# Patient Record
Sex: FEMALE | Race: WHITE | NOT HISPANIC OR LATINO
[De-identification: names, ages, dates, MRNs, and addresses within clinical notes are randomized per-mention and may not be internally consistent; named-entity substitution may affect disease eponyms.]

---

## 2017-05-01 ENCOUNTER — APPOINTMENT (OUTPATIENT)
Dept: CARDIOLOGY | Facility: CLINIC | Age: 67
End: 2017-05-01

## 2017-05-01 ENCOUNTER — NON-APPOINTMENT (OUTPATIENT)
Age: 67
End: 2017-05-01

## 2017-05-01 VITALS
HEART RATE: 73 BPM | BODY MASS INDEX: 20.25 KG/M2 | HEIGHT: 66 IN | OXYGEN SATURATION: 97 % | DIASTOLIC BLOOD PRESSURE: 76 MMHG | WEIGHT: 126 LBS | SYSTOLIC BLOOD PRESSURE: 135 MMHG

## 2017-05-01 DIAGNOSIS — Z82.49 FAMILY HISTORY OF ISCHEMIC HEART DISEASE AND OTHER DISEASES OF THE CIRCULATORY SYSTEM: ICD-10-CM

## 2017-05-30 ENCOUNTER — APPOINTMENT (OUTPATIENT)
Dept: ENDOCRINOLOGY | Facility: CLINIC | Age: 67
End: 2017-05-30

## 2017-05-30 VITALS
SYSTOLIC BLOOD PRESSURE: 100 MMHG | OXYGEN SATURATION: 99 % | BODY MASS INDEX: 20.12 KG/M2 | HEIGHT: 66 IN | WEIGHT: 125.2 LBS | DIASTOLIC BLOOD PRESSURE: 60 MMHG | HEART RATE: 109 BPM

## 2017-06-22 ENCOUNTER — APPOINTMENT (OUTPATIENT)
Dept: CARDIOLOGY | Facility: CLINIC | Age: 67
End: 2017-06-22

## 2017-06-28 ENCOUNTER — OUTPATIENT (OUTPATIENT)
Dept: OUTPATIENT SERVICES | Facility: HOSPITAL | Age: 67
LOS: 1 days | End: 2017-06-28
Payer: COMMERCIAL

## 2017-06-28 ENCOUNTER — APPOINTMENT (OUTPATIENT)
Dept: ULTRASOUND IMAGING | Facility: CLINIC | Age: 67
End: 2017-06-28

## 2017-06-28 ENCOUNTER — APPOINTMENT (OUTPATIENT)
Dept: MAMMOGRAPHY | Facility: CLINIC | Age: 67
End: 2017-06-28

## 2017-06-28 DIAGNOSIS — Z00.8 ENCOUNTER FOR OTHER GENERAL EXAMINATION: ICD-10-CM

## 2017-06-28 PROCEDURE — 76641 ULTRASOUND BREAST COMPLETE: CPT

## 2017-06-28 PROCEDURE — 77067 SCR MAMMO BI INCL CAD: CPT

## 2017-06-28 PROCEDURE — 77063 BREAST TOMOSYNTHESIS BI: CPT

## 2017-07-05 DIAGNOSIS — R92.2 INCONCLUSIVE MAMMOGRAM: ICD-10-CM

## 2017-07-05 DIAGNOSIS — Z12.31 ENCOUNTER FOR SCREENING MAMMOGRAM FOR MALIGNANT NEOPLASM OF BREAST: ICD-10-CM

## 2017-07-05 DIAGNOSIS — Z85.3 PERSONAL HISTORY OF MALIGNANT NEOPLASM OF BREAST: ICD-10-CM

## 2017-09-07 ENCOUNTER — APPOINTMENT (OUTPATIENT)
Dept: SURGERY | Facility: CLINIC | Age: 67
End: 2017-09-07
Payer: MEDICARE

## 2017-09-07 PROCEDURE — 99213K: CUSTOM

## 2017-10-10 ENCOUNTER — APPOINTMENT (OUTPATIENT)
Dept: CARDIOLOGY | Facility: CLINIC | Age: 67
End: 2017-10-10
Payer: MEDICARE

## 2017-10-10 DIAGNOSIS — R94.39 ABNORMAL RESULT OF OTHER CARDIOVASCULAR FUNCTION STUDY: ICD-10-CM

## 2017-10-10 PROCEDURE — 93015 CV STRESS TEST SUPVJ I&R: CPT

## 2017-10-12 PROBLEM — R94.39 ABNORMAL STRESS ECG: Status: ACTIVE | Noted: 2017-10-12

## 2017-10-31 ENCOUNTER — FORM ENCOUNTER (OUTPATIENT)
Age: 67
End: 2017-10-31

## 2017-11-01 ENCOUNTER — OUTPATIENT (OUTPATIENT)
Dept: OUTPATIENT SERVICES | Facility: HOSPITAL | Age: 67
LOS: 1 days | End: 2017-11-01
Payer: MEDICARE

## 2017-11-01 ENCOUNTER — APPOINTMENT (OUTPATIENT)
Dept: RADIOLOGY | Facility: IMAGING CENTER | Age: 67
End: 2017-11-01
Payer: MEDICARE

## 2017-11-01 DIAGNOSIS — M85.80 OTHER SPECIFIED DISORDERS OF BONE DENSITY AND STRUCTURE, UNSPECIFIED SITE: ICD-10-CM

## 2017-11-01 PROCEDURE — 77080 DXA BONE DENSITY AXIAL: CPT | Mod: 26

## 2017-11-01 PROCEDURE — 77080 DXA BONE DENSITY AXIAL: CPT

## 2017-11-03 ENCOUNTER — APPOINTMENT (OUTPATIENT)
Dept: CARDIOLOGY | Facility: CLINIC | Age: 67
End: 2017-11-03
Payer: MEDICARE

## 2017-11-03 DIAGNOSIS — R09.89 OTHER SPECIFIED SYMPTOMS AND SIGNS INVOLVING THE CIRCULATORY AND RESPIRATORY SYSTEMS: ICD-10-CM

## 2017-11-03 PROCEDURE — 78452 HT MUSCLE IMAGE SPECT MULT: CPT

## 2017-11-03 PROCEDURE — 93015 CV STRESS TEST SUPVJ I&R: CPT

## 2017-11-03 PROCEDURE — A9500: CPT

## 2017-11-06 PROBLEM — R09.89 OTH SYMPTOMS AND SIGNS INVOLVING THE CIRC AND RESP SYSTEMS: Status: ACTIVE | Noted: 2017-11-06

## 2017-11-15 ENCOUNTER — APPOINTMENT (OUTPATIENT)
Dept: ENDOCRINOLOGY | Facility: CLINIC | Age: 67
End: 2017-11-15
Payer: MEDICARE

## 2017-11-15 VITALS
OXYGEN SATURATION: 98 % | BODY MASS INDEX: 20.09 KG/M2 | HEART RATE: 76 BPM | HEIGHT: 66 IN | WEIGHT: 125 LBS | DIASTOLIC BLOOD PRESSURE: 70 MMHG | SYSTOLIC BLOOD PRESSURE: 110 MMHG

## 2017-11-15 PROCEDURE — 99214 OFFICE O/P EST MOD 30 MIN: CPT

## 2017-11-21 ENCOUNTER — APPOINTMENT (OUTPATIENT)
Dept: CARDIOLOGY | Facility: CLINIC | Age: 67
End: 2017-11-21
Payer: MEDICARE

## 2017-11-21 PROCEDURE — 93880 EXTRACRANIAL BILAT STUDY: CPT

## 2017-12-04 ENCOUNTER — APPOINTMENT (OUTPATIENT)
Dept: MRI IMAGING | Facility: CLINIC | Age: 67
End: 2017-12-04
Payer: MEDICARE

## 2017-12-04 ENCOUNTER — OUTPATIENT (OUTPATIENT)
Dept: OUTPATIENT SERVICES | Facility: HOSPITAL | Age: 67
LOS: 1 days | End: 2017-12-04
Payer: MEDICARE

## 2017-12-04 DIAGNOSIS — Z00.8 ENCOUNTER FOR OTHER GENERAL EXAMINATION: ICD-10-CM

## 2017-12-04 PROCEDURE — 82565 ASSAY OF CREATININE: CPT

## 2017-12-04 PROCEDURE — 0159T: CPT | Mod: 26

## 2017-12-04 PROCEDURE — C8908: CPT

## 2017-12-04 PROCEDURE — A9585: CPT

## 2017-12-04 PROCEDURE — C8937: CPT

## 2017-12-04 PROCEDURE — 77059 MRI BREAST BILATERAL: CPT | Mod: 26

## 2017-12-06 ENCOUNTER — APPOINTMENT (OUTPATIENT)
Dept: ENDOCRINOLOGY | Facility: CLINIC | Age: 67
End: 2017-12-06

## 2018-06-11 ENCOUNTER — APPOINTMENT (OUTPATIENT)
Dept: ENDOCRINOLOGY | Facility: CLINIC | Age: 68
End: 2018-06-11
Payer: MEDICARE

## 2018-06-11 VITALS
HEART RATE: 77 BPM | BODY MASS INDEX: 21.53 KG/M2 | SYSTOLIC BLOOD PRESSURE: 110 MMHG | OXYGEN SATURATION: 98 % | HEIGHT: 66 IN | DIASTOLIC BLOOD PRESSURE: 70 MMHG | WEIGHT: 134 LBS

## 2018-06-11 PROCEDURE — 99214 OFFICE O/P EST MOD 30 MIN: CPT | Mod: GC

## 2018-06-12 LAB
25(OH)D3 SERPL-MCNC: 49.8 NG/ML
ALBUMIN SERPL ELPH-MCNC: 5.1 G/DL
ALP BLD-CCNC: 37 U/L
ALT SERPL-CCNC: 6 U/L
ANION GAP SERPL CALC-SCNC: 14 MMOL/L
AST SERPL-CCNC: 22 U/L
BILIRUB SERPL-MCNC: 0.3 MG/DL
BUN SERPL-MCNC: 18 MG/DL
CALCIUM SERPL-MCNC: 10.4 MG/DL
CHLORIDE SERPL-SCNC: 102 MMOL/L
CO2 SERPL-SCNC: 27 MMOL/L
CREAT SERPL-MCNC: 0.87 MG/DL
GLUCOSE SERPL-MCNC: 103 MG/DL
HBA1C MFR BLD HPLC: 5.6 %
POTASSIUM SERPL-SCNC: 4.3 MMOL/L
PROT SERPL-MCNC: 7.8 G/DL
SODIUM SERPL-SCNC: 143 MMOL/L
T4 FREE SERPL-MCNC: 1.4 NG/DL
TSH SERPL-ACNC: 1.38 UIU/ML

## 2018-06-28 ENCOUNTER — FORM ENCOUNTER (OUTPATIENT)
Age: 68
End: 2018-06-28

## 2018-06-29 ENCOUNTER — APPOINTMENT (OUTPATIENT)
Dept: ULTRASOUND IMAGING | Facility: CLINIC | Age: 68
End: 2018-06-29
Payer: MEDICARE

## 2018-06-29 ENCOUNTER — APPOINTMENT (OUTPATIENT)
Dept: MAMMOGRAPHY | Facility: CLINIC | Age: 68
End: 2018-06-29
Payer: MEDICARE

## 2018-06-29 ENCOUNTER — OUTPATIENT (OUTPATIENT)
Dept: OUTPATIENT SERVICES | Facility: HOSPITAL | Age: 68
LOS: 1 days | End: 2018-06-29
Payer: MEDICARE

## 2018-06-29 DIAGNOSIS — Z00.8 ENCOUNTER FOR OTHER GENERAL EXAMINATION: ICD-10-CM

## 2018-06-29 PROCEDURE — 77063 BREAST TOMOSYNTHESIS BI: CPT | Mod: 26

## 2018-06-29 PROCEDURE — 77067 SCR MAMMO BI INCL CAD: CPT | Mod: 26

## 2018-06-29 PROCEDURE — 77063 BREAST TOMOSYNTHESIS BI: CPT

## 2018-06-29 PROCEDURE — 77067 SCR MAMMO BI INCL CAD: CPT

## 2018-06-29 PROCEDURE — 76641 ULTRASOUND BREAST COMPLETE: CPT | Mod: 26,50

## 2018-06-29 PROCEDURE — 76641 ULTRASOUND BREAST COMPLETE: CPT

## 2018-09-12 ENCOUNTER — APPOINTMENT (OUTPATIENT)
Dept: SURGERY | Facility: CLINIC | Age: 68
End: 2018-09-12
Payer: MEDICARE

## 2018-09-12 PROCEDURE — 99213K: CUSTOM

## 2018-11-04 ENCOUNTER — RX RENEWAL (OUTPATIENT)
Age: 68
End: 2018-11-04

## 2018-11-05 ENCOUNTER — FORM ENCOUNTER (OUTPATIENT)
Age: 68
End: 2018-11-05

## 2018-11-06 ENCOUNTER — OUTPATIENT (OUTPATIENT)
Dept: OUTPATIENT SERVICES | Facility: HOSPITAL | Age: 68
LOS: 1 days | End: 2018-11-06
Payer: MEDICARE

## 2018-11-06 ENCOUNTER — APPOINTMENT (OUTPATIENT)
Dept: RADIOLOGY | Facility: IMAGING CENTER | Age: 68
End: 2018-11-06
Payer: MEDICARE

## 2018-11-06 DIAGNOSIS — M85.80 OTHER SPECIFIED DISORDERS OF BONE DENSITY AND STRUCTURE, UNSPECIFIED SITE: ICD-10-CM

## 2018-11-06 PROCEDURE — 77080 DXA BONE DENSITY AXIAL: CPT

## 2018-11-06 PROCEDURE — 77080 DXA BONE DENSITY AXIAL: CPT | Mod: 26

## 2018-11-14 ENCOUNTER — OTHER (OUTPATIENT)
Age: 68
End: 2018-11-14

## 2018-12-05 ENCOUNTER — OUTPATIENT (OUTPATIENT)
Dept: OUTPATIENT SERVICES | Facility: HOSPITAL | Age: 68
LOS: 1 days | End: 2018-12-05
Payer: MEDICARE

## 2018-12-05 ENCOUNTER — APPOINTMENT (OUTPATIENT)
Dept: MRI IMAGING | Facility: CLINIC | Age: 68
End: 2018-12-05

## 2018-12-05 DIAGNOSIS — Z00.8 ENCOUNTER FOR OTHER GENERAL EXAMINATION: ICD-10-CM

## 2018-12-05 PROCEDURE — A9585: CPT

## 2018-12-05 PROCEDURE — C8937: CPT

## 2018-12-05 PROCEDURE — C8908: CPT

## 2018-12-05 PROCEDURE — 77059 MRI BREAST BILATERAL: CPT | Mod: 26

## 2018-12-05 PROCEDURE — 82565 ASSAY OF CREATININE: CPT

## 2018-12-05 PROCEDURE — 0159T: CPT | Mod: 26

## 2018-12-19 ENCOUNTER — APPOINTMENT (OUTPATIENT)
Dept: ENDOCRINOLOGY | Facility: CLINIC | Age: 68
End: 2018-12-19
Payer: MEDICARE

## 2018-12-19 VITALS — SYSTOLIC BLOOD PRESSURE: 114 MMHG | HEART RATE: 78 BPM | DIASTOLIC BLOOD PRESSURE: 72 MMHG | OXYGEN SATURATION: 97 %

## 2018-12-19 VITALS — BODY MASS INDEX: 21.86 KG/M2 | HEIGHT: 66 IN | WEIGHT: 136 LBS

## 2018-12-19 LAB
ALBUMIN SERPL ELPH-MCNC: 4.9 G/DL
ALP BLD-CCNC: 33 U/L
ALT SERPL-CCNC: 5 U/L
ANION GAP SERPL CALC-SCNC: 12 MMOL/L
AST SERPL-CCNC: 18 U/L
BILIRUB SERPL-MCNC: 0.4 MG/DL
BUN SERPL-MCNC: 26 MG/DL
CALCIUM SERPL-MCNC: 10.2 MG/DL
CHLORIDE SERPL-SCNC: 102 MMOL/L
CO2 SERPL-SCNC: 28 MMOL/L
CREAT SERPL-MCNC: 0.79 MG/DL
GLUCOSE SERPL-MCNC: 113 MG/DL
POTASSIUM SERPL-SCNC: 3.9 MMOL/L
PROT SERPL-MCNC: 8 G/DL
SODIUM SERPL-SCNC: 142 MMOL/L

## 2018-12-19 PROCEDURE — 99214 OFFICE O/P EST MOD 30 MIN: CPT | Mod: GC

## 2019-06-12 ENCOUNTER — APPOINTMENT (OUTPATIENT)
Dept: ENDOCRINOLOGY | Facility: CLINIC | Age: 69
End: 2019-06-12
Payer: MEDICARE

## 2019-06-12 VITALS
WEIGHT: 140 LBS | HEIGHT: 66 IN | SYSTOLIC BLOOD PRESSURE: 110 MMHG | BODY MASS INDEX: 22.5 KG/M2 | HEART RATE: 77 BPM | OXYGEN SATURATION: 98 % | DIASTOLIC BLOOD PRESSURE: 60 MMHG

## 2019-06-12 PROCEDURE — 99214 OFFICE O/P EST MOD 30 MIN: CPT

## 2019-06-12 NOTE — ASSESSMENT
[Bisphosphonate Therapy] : Risks  and benefits of bisphosphonate therapy were  discussed with the patient including gastroesophageal irritation, osteonecrosis of the jaw, and atypical femur fractures, and acute phase reaction [FreeTextEntry1] : 68 y.o. female with h/o osteopenia and thyroid nodules.\par 1. Osteopenia- Patient has been restarted on Alendronate since November 2017 and is tolerating it well. As per recent DEXA 1 year in therapy, hip stable, and spine with 4.5% increase, but suspect may also be secondary to osteoarthritic changes. Overall stable on current therapy. Encouraged weight bearing activity. Will continue current calcium and vitamin D supplement. Recommended that can take one pill daily on days with adequate calcium intake with dairy and leafy greens. \par -will check CMP, serum calcium and intact PTH\par 2. Thyroid nodules- Patient appears euthyroid. Will check TFTs. Nodules are stable and will monitor for now. Repeat ultrasound in November 2019. \par 3. IFG-  Will check Hba1c. Will monitor for now and encouraged carbohydrate consistent diet\par \par Follow up in 6 months\par Follow up with ortho for hip pain and foot pain

## 2019-06-12 NOTE — REVIEW OF SYSTEMS
[Fatigue] : no fatigue [Decreased Appetite] : appetite not decreased [Recent Weight Gain (___ Lbs)] : no recent weight gain [Recent Weight Loss (___ Lbs)] : no recent weight loss [Chills] : no chills [Blurry Vision] : no blurred vision [Eye Pain] : no eye pain [Dysphagia] : no dysphagia [Dysphonia] : no dysphonia [Neck Pain] : no neck pain [Nasal Congestion] : no nasal congestion [Chest Pain] : no chest pain [Palpitations] : no palpitations [Heart Rate Is Slow] : the heart rate was not slow [Heart Rate Is Fast] : the heart rate was not fast [Lower Ext Edema] : no lower extremity edema [Shortness Of Breath] : no shortness of breath [Wheezing] : no wheezing was heard [Cough] : no cough [SOB on Exertion] : no shortness of breath during exertion [Nausea] : no nausea [Vomiting] : no vomiting was observed [Constipation] : no constipation [Diarrhea] : no diarrhea [Abdominal Pain] : no abdominal pain [Polyuria] : no polyuria [Dysuria] : no dysuria [Joint Pain] : joint pain [Joint Stiffness] : no joint stiffness [Muscle Weakness] : no muscle weakness [Muscle Cramps] : no muscle cramps [Headache] : no headaches [Tremors] : no tremors [Dizziness] : no dizziness [Pain/Numbness of Digits] : no pain/numbness of digits [Polydipsia] : no polydipsia [Cold Intolerance] : cold tolerant [Heat Intolerance] : heat tolerant

## 2019-06-12 NOTE — PHYSICAL EXAM
[Alert] : alert [Well Nourished] : well nourished [No Acute Distress] : no acute distress [Well Developed] : well developed [Normal Sclera/Conjunctiva] : normal sclera/conjunctiva [Normal Voice/Communication] : normal voice communication [Healthy Appearance] : healthy appearance [EOMI] : extra ocular movement intact [Normal Oropharynx] : the oropharynx was normal [Normal Nasal Mucosa] : the nasal mucosa was normal [Normal Lips/Gums] : the lips and gums were normal [No Neck Mass] : no neck mass was observed [Supple] : the neck was supple [No LAD] : no lymphadenopathy [Clear to Auscultation] : lungs were clear to auscultation bilaterally [Normal Rate] : heart rate was normal  [Normal S1, S2] : normal S1 and S2 [Regular Rhythm] : with a regular rhythm [No Edema] : there was no peripheral edema [Not Tender] : non-tender [Normal Bowel Sounds] : normal bowel sounds [Not Distended] : not distended [Soft] : abdomen soft [Post Cervical Nodes] : posterior cervical nodes [Normal] : normal and non tender [Anterior Cervical Nodes] : anterior cervical nodes [No Clubbing, Cyanosis] : no clubbing  or cyanosis of the fingernails [No Rash] : no rash [Normal Reflexes] : deep tendon reflexes were 2+ and symmetric [No Sensory Deficits] : the sensory exam was normal to light touch and pinprick [Normal Affect] : the affect was normal [Normal Mood] : the mood was normal [No Accessory Muscle Use] : no accessory muscle use [de-identified] : prominent gland

## 2019-06-12 NOTE — HISTORY OF PRESENT ILLNESS
[FreeTextEntry1] : 68 y.o. female with h/o osteopenia and thyroid nodules here for follow up visit. Following with Dr. Escamilla for breast cancer s/p lumpectomy and RT and planning to be on Arimidex for total of 10 years- until Oct 2019. Was on drug holiday from Alendronate for 2 years from November 2015 until November 2017. Was treated with Alendronate from 2005 until 2015. No falls or fractures. C/o b/l hip pain since May 2019 but better. Never went for x-rays. Takes Citracal  600 mg twice daily with vitamin D 500 IU. DEXA scan in November 2017 showed femoral neck -2.3 total hip -2.2 and spine -0.7. A 6% decrease in the Fem neck was noted as compared to 2016. Patient was offered Prolia but she declined. She was restarted on Alendronate in November 2017. Doing some walking. Follows with dentist, no plans for major dental procedures. Reports has a history of gastric ulcers >10 years ago and had to get EGD. Reports has been tolerating Alendronate well for 10 years with no acid reflux. Most recent DEXA in November 2018, with femoral neck -2.4, stable, total hip -2.1 stable, and spine -1.2% a 4.5% increase. \par \par In regards to thyroid nodules, no dysphagia and no dysphonia. Sonogram in April 2017 showed increase in size of left lower pole and right mid/lower pole nodules. However, thyroid ultrasound on 11/2/17 showed stable b/l nodules. Had FNA in 2013 of left mid and lower pole nodules that were benign. Thyroid US in November 2018 showed stable thyroid nodules, with appearance of a new subcentimeter nodules on the right and left. No lymphadenopathy. \par

## 2019-06-14 LAB
25(OH)D3 SERPL-MCNC: 46.3 NG/ML
ALBUMIN SERPL ELPH-MCNC: 5.1 G/DL
ALP BLD-CCNC: 38 U/L
ALT SERPL-CCNC: 7 U/L
ANION GAP SERPL CALC-SCNC: 15 MMOL/L
AST SERPL-CCNC: 15 U/L
BILIRUB SERPL-MCNC: 0.4 MG/DL
BUN SERPL-MCNC: 20 MG/DL
CALCIUM SERPL-MCNC: 10.6 MG/DL
CALCIUM SERPL-MCNC: 10.6 MG/DL
CHLORIDE SERPL-SCNC: 99 MMOL/L
CO2 SERPL-SCNC: 26 MMOL/L
CREAT SERPL-MCNC: 0.88 MG/DL
ESTIMATED AVERAGE GLUCOSE: 111 MG/DL
GLUCOSE SERPL-MCNC: 100 MG/DL
HBA1C MFR BLD HPLC: 5.5 %
PARATHYROID HORMONE INTACT: 27 PG/ML
PHOSPHATE SERPL-MCNC: 3.8 MG/DL
POTASSIUM SERPL-SCNC: 4.8 MMOL/L
PROT SERPL-MCNC: 7.9 G/DL
SODIUM SERPL-SCNC: 140 MMOL/L
TSH SERPL-ACNC: 1.27 UIU/ML

## 2019-07-01 ENCOUNTER — APPOINTMENT (OUTPATIENT)
Dept: MAMMOGRAPHY | Facility: CLINIC | Age: 69
End: 2019-07-01

## 2019-07-01 ENCOUNTER — APPOINTMENT (OUTPATIENT)
Dept: ULTRASOUND IMAGING | Facility: CLINIC | Age: 69
End: 2019-07-01

## 2019-07-01 ENCOUNTER — OUTPATIENT (OUTPATIENT)
Dept: OUTPATIENT SERVICES | Facility: HOSPITAL | Age: 69
LOS: 1 days | End: 2019-07-01
Payer: MEDICARE

## 2019-07-01 DIAGNOSIS — Z00.8 ENCOUNTER FOR OTHER GENERAL EXAMINATION: ICD-10-CM

## 2019-07-01 PROCEDURE — G0279: CPT | Mod: 26

## 2019-07-01 PROCEDURE — 77066 DX MAMMO INCL CAD BI: CPT | Mod: 26

## 2019-07-01 PROCEDURE — 77066 DX MAMMO INCL CAD BI: CPT

## 2019-07-01 PROCEDURE — G0279: CPT

## 2019-07-01 PROCEDURE — 76641 ULTRASOUND BREAST COMPLETE: CPT

## 2019-07-01 PROCEDURE — 77063 BREAST TOMOSYNTHESIS BI: CPT

## 2019-07-01 PROCEDURE — 76641 ULTRASOUND BREAST COMPLETE: CPT | Mod: 26,50

## 2019-07-01 PROCEDURE — 77067 SCR MAMMO BI INCL CAD: CPT

## 2019-09-16 ENCOUNTER — APPOINTMENT (OUTPATIENT)
Dept: SURGERY | Facility: CLINIC | Age: 69
End: 2019-09-16
Payer: MEDICARE

## 2019-09-16 PROCEDURE — 99213K: CUSTOM

## 2019-10-04 ENCOUNTER — RX RENEWAL (OUTPATIENT)
Age: 69
End: 2019-10-04

## 2019-11-13 ENCOUNTER — LABORATORY RESULT (OUTPATIENT)
Age: 69
End: 2019-11-13

## 2019-11-13 ENCOUNTER — APPOINTMENT (OUTPATIENT)
Dept: ENDOCRINOLOGY | Facility: CLINIC | Age: 69
End: 2019-11-13
Payer: MEDICARE

## 2019-11-13 VITALS
SYSTOLIC BLOOD PRESSURE: 120 MMHG | HEIGHT: 65.25 IN | DIASTOLIC BLOOD PRESSURE: 80 MMHG | WEIGHT: 141 LBS | OXYGEN SATURATION: 98 % | HEART RATE: 89 BPM | BODY MASS INDEX: 23.21 KG/M2

## 2019-11-13 VITALS — WEIGHT: 141 LBS | BODY MASS INDEX: 23.21 KG/M2 | HEIGHT: 65.25 IN

## 2019-11-13 PROCEDURE — 99214 OFFICE O/P EST MOD 30 MIN: CPT | Mod: 25,GC

## 2019-11-13 PROCEDURE — ZZZZZ: CPT

## 2019-11-13 PROCEDURE — 77080 DXA BONE DENSITY AXIAL: CPT | Mod: GA,GC

## 2019-11-13 NOTE — PHYSICAL EXAM
[Alert] : alert [Well Nourished] : well nourished [No Acute Distress] : no acute distress [Well Developed] : well developed [Normal Voice/Communication] : normal voice communication [Healthy Appearance] : healthy appearance [Normal Sclera/Conjunctiva] : normal sclera/conjunctiva [EOMI] : extra ocular movement intact [Normal Nasal Mucosa] : the nasal mucosa was normal [Normal Lips/Gums] : the lips and gums were normal [Normal Oropharynx] : the oropharynx was normal [No Neck Mass] : no neck mass was observed [Supple] : the neck was supple [No LAD] : no lymphadenopathy [Normal Rate] : heart rate was normal  [No Accessory Muscle Use] : no accessory muscle use [Clear to Auscultation] : lungs were clear to auscultation bilaterally [Normal S1, S2] : normal S1 and S2 [Regular Rhythm] : with a regular rhythm [Normal Bowel Sounds] : normal bowel sounds [Not Tender] : non-tender [No Edema] : there was no peripheral edema [Soft] : abdomen soft [Not Distended] : not distended [Anterior Cervical Nodes] : anterior cervical nodes [Post Cervical Nodes] : posterior cervical nodes [No Rash] : no rash [Normal] : normal and non tender [No Clubbing, Cyanosis] : no clubbing  or cyanosis of the fingernails [No Sensory Deficits] : the sensory exam was normal to light touch and pinprick [Normal Reflexes] : deep tendon reflexes were 2+ and symmetric [Normal Affect] : the affect was normal [Normal Mood] : the mood was normal [de-identified] : prominent gland

## 2019-11-13 NOTE — ASSESSMENT
[Carbohydrate Consistent Diet] : carbohydrate consistent diet [Bisphosphonate Therapy] : Risks  and benefits of bisphosphonate therapy were  discussed with the patient including gastroesophageal irritation, osteonecrosis of the jaw, and atypical femur fractures, and acute phase reaction [FreeTextEntry1] : 69 y.o. female with h/o osteopenia and thyroid nodules.\par 1. Osteopenia- Patient has been restarted on Alendronate since November 2017 and is tolerating it well.  DEXA scan performed today shows - total spine 1.0, femoral neck -1.9, total hip -1.8, 1/3 radius -0.9. As per recent DEXA 2 years on therapy, hip improved, and spine stable, but suspect may also be secondary to osteoarthritic changes. Wrist WNL. Overall improved and with patient now off Arimidex since Sept 2019, will give drug holiday. Will repeat DEXA in Nov 2020, 1 year off therapy. Encouraged weight bearing activity. Will continue current calcium and vitamin D supplement. \par -will check CMP. Last vitamin D WNL.\par 2. Thyroid nodules- Patient is euthyroid. TFTs WNL in June 2019.  Nodules are stable and will monitor for now. Repeat thyroid US in 2 years- Nov 2021\par 3. IFG-  Last A1C 5.5% in June 2019. Will monitor for now and encouraged carbohydrate consistent diet\par \par Follow up in 6 months\par \par D/W Dr. Rain

## 2019-11-13 NOTE — REVIEW OF SYSTEMS
[Joint Pain] : joint pain [Fatigue] : no fatigue [Recent Weight Gain (___ Lbs)] : no recent weight gain [Decreased Appetite] : appetite not decreased [Recent Weight Loss (___ Lbs)] : no recent weight loss [Chills] : no chills [Blurry Vision] : no blurred vision [Eye Pain] : no eye pain [Dysphagia] : no dysphagia [Dysphonia] : no dysphonia [Neck Pain] : no neck pain [Nasal Congestion] : no nasal congestion [Chest Pain] : no chest pain [Palpitations] : no palpitations [Heart Rate Is Slow] : the heart rate was not slow [Heart Rate Is Fast] : the heart rate was not fast [Lower Ext Edema] : no lower extremity edema [Shortness Of Breath] : no shortness of breath [Wheezing] : no wheezing was heard [SOB on Exertion] : no shortness of breath during exertion [Cough] : no cough [Vomiting] : no vomiting was observed [Nausea] : no nausea [Constipation] : no constipation [Diarrhea] : no diarrhea [Abdominal Pain] : no abdominal pain [Polyuria] : no polyuria [Dysuria] : no dysuria [Joint Stiffness] : no joint stiffness [Muscle Weakness] : no muscle weakness [Muscle Cramps] : no muscle cramps [Headache] : no headaches [Tremors] : no tremors [Dizziness] : no dizziness [Pain/Numbness of Digits] : no pain/numbness of digits [Polydipsia] : no polydipsia [Cold Intolerance] : cold tolerant [Heat Intolerance] : heat tolerant

## 2019-11-13 NOTE — HISTORY OF PRESENT ILLNESS
[FreeTextEntry1] : 69 y.o. female with h/o osteopenia and thyroid nodules here for follow up visit. Following with Dr. Escamilla for breast cancer s/p lumpectomy and RT and planning to be on Arimidex for total of 10 years. Finished Arimidex in Sept 2019. Was on drug holiday from Alendronate for 2 years from November 2015 until November 2017. Was treated with Alendronate from 2005 until 2015. No falls or fractures. On last visit serum calcium was mildly elevated 10.6, PTH 27. Decreased Citrical to 600 mg q daily and vitamin D 500 IU. DEXA scan in November 2017 showed femoral neck -2.3 total hip -2.2 and spine -0.7. A 6% decrease in the Fem neck was noted as compared to 2016. Patient was offered Prolia but she declined. She was restarted on Alendronate in November 2017. Doing some walking. Follows with dentist, no plans for major dental procedures. Reports has a history of gastric ulcers >10 years ago and had to get EGD. Reports has been tolerating Alendronate well for 10 years with no acid reflux. Most recent DEXA in November 2018, with femoral neck -2.4, stable, total hip -2.1 stable, and spine -1.2% a 4.5% increase. \par DEXA Nov 2019- total spine 1.0, femoral neck -1.9, total hip -1.8, 1/3 radius -0.9. \par \par In regards to thyroid nodules, no dysphagia and no dysphonia. Sonogram in April 2017 showed increase in size of left lower pole and right mid/lower pole nodules. However, thyroid ultrasound on 11/2/17 showed stable b/l nodules. Had FNA in 2013 of left mid and lower pole nodules that were benign. Thyroid US in November 2018 showed stable thyroid nodules, with appearance of a new subcentimeter nodules on the right and left. No lymphadenopathy. Thyroid US in Nov 2019 shows stable b/l subcentimeter nodules.\par

## 2019-11-15 LAB
ALBUMIN SERPL ELPH-MCNC: 5.4 G/DL
ALP BLD-CCNC: 45 U/L
ALT SERPL-CCNC: 11 U/L
ANION GAP SERPL CALC-SCNC: 18 MMOL/L
AST SERPL-CCNC: 20 U/L
BILIRUB SERPL-MCNC: 0.3 MG/DL
BUN SERPL-MCNC: 24 MG/DL
CALCIUM SERPL-MCNC: 11.1 MG/DL
CHLORIDE SERPL-SCNC: 102 MMOL/L
CO2 SERPL-SCNC: 23 MMOL/L
CREAT SERPL-MCNC: 0.85 MG/DL
GLUCOSE SERPL-MCNC: 112 MG/DL
POTASSIUM SERPL-SCNC: 5.1 MMOL/L
PROT SERPL-MCNC: 8.2 G/DL
SODIUM SERPL-SCNC: 143 MMOL/L

## 2020-01-06 ENCOUNTER — OUTPATIENT (OUTPATIENT)
Dept: OUTPATIENT SERVICES | Facility: HOSPITAL | Age: 70
LOS: 1 days | End: 2020-01-06
Payer: MEDICARE

## 2020-01-06 ENCOUNTER — APPOINTMENT (OUTPATIENT)
Dept: MRI IMAGING | Facility: CLINIC | Age: 70
End: 2020-01-06
Payer: MEDICARE

## 2020-01-06 DIAGNOSIS — Z00.8 ENCOUNTER FOR OTHER GENERAL EXAMINATION: ICD-10-CM

## 2020-01-06 PROCEDURE — 77049 MRI BREAST C-+ W/CAD BI: CPT | Mod: 26

## 2020-01-06 PROCEDURE — A9585: CPT

## 2020-01-06 PROCEDURE — C8908: CPT

## 2020-01-06 PROCEDURE — C8937: CPT

## 2020-05-13 ENCOUNTER — APPOINTMENT (OUTPATIENT)
Dept: ENDOCRINOLOGY | Facility: CLINIC | Age: 70
End: 2020-05-13

## 2020-08-24 ENCOUNTER — APPOINTMENT (OUTPATIENT)
Dept: ULTRASOUND IMAGING | Facility: CLINIC | Age: 70
End: 2020-08-24
Payer: MEDICARE

## 2020-08-24 ENCOUNTER — RESULT REVIEW (OUTPATIENT)
Age: 70
End: 2020-08-24

## 2020-08-24 ENCOUNTER — APPOINTMENT (OUTPATIENT)
Dept: MAMMOGRAPHY | Facility: CLINIC | Age: 70
End: 2020-08-24
Payer: MEDICARE

## 2020-08-24 ENCOUNTER — OUTPATIENT (OUTPATIENT)
Dept: OUTPATIENT SERVICES | Facility: HOSPITAL | Age: 70
LOS: 1 days | End: 2020-08-24
Payer: MEDICARE

## 2020-08-24 DIAGNOSIS — Z00.8 ENCOUNTER FOR OTHER GENERAL EXAMINATION: ICD-10-CM

## 2020-08-24 PROCEDURE — 77063 BREAST TOMOSYNTHESIS BI: CPT | Mod: 26

## 2020-08-24 PROCEDURE — 77067 SCR MAMMO BI INCL CAD: CPT | Mod: 26

## 2020-08-24 PROCEDURE — 76641 ULTRASOUND BREAST COMPLETE: CPT | Mod: 26,50

## 2020-08-24 PROCEDURE — 76641 ULTRASOUND BREAST COMPLETE: CPT

## 2020-08-24 PROCEDURE — 77067 SCR MAMMO BI INCL CAD: CPT

## 2020-08-24 PROCEDURE — 77063 BREAST TOMOSYNTHESIS BI: CPT

## 2020-11-11 ENCOUNTER — APPOINTMENT (OUTPATIENT)
Dept: SURGERY | Facility: CLINIC | Age: 70
End: 2020-11-11
Payer: MEDICARE

## 2020-11-11 PROCEDURE — 99213K: CUSTOM

## 2020-11-18 ENCOUNTER — APPOINTMENT (OUTPATIENT)
Dept: ENDOCRINOLOGY | Facility: CLINIC | Age: 70
End: 2020-11-18
Payer: MEDICARE

## 2020-11-18 VITALS — DIASTOLIC BLOOD PRESSURE: 80 MMHG | OXYGEN SATURATION: 98 % | HEART RATE: 89 BPM | SYSTOLIC BLOOD PRESSURE: 140 MMHG

## 2020-11-18 VITALS — TEMPERATURE: 98 F | BODY MASS INDEX: 20.66 KG/M2 | WEIGHT: 124 LBS | HEIGHT: 65 IN

## 2020-11-18 DIAGNOSIS — E83.52 HYPERCALCEMIA: ICD-10-CM

## 2020-11-18 PROCEDURE — 99214 OFFICE O/P EST MOD 30 MIN: CPT | Mod: 25

## 2020-11-18 PROCEDURE — ZZZZZ: CPT

## 2020-11-18 PROCEDURE — 77080 DXA BONE DENSITY AXIAL: CPT | Mod: GA

## 2020-11-18 NOTE — REVIEW OF SYSTEMS
[Recent Weight Loss (___ Lbs)] : recent weight loss: [unfilled] lbs [Headaches] : headaches [Negative] : Respiratory [Fatigue] : no fatigue [Constipation] : no constipation [Abdominal Pain] : no abdominal pain [Diarrhea] : no diarrhea [Polyuria] : no polyuria [Dry Skin] : no dry skin [Hair Loss] : no hair loss [Polydipsia] : no polydipsia [Swelling] : no swelling [FreeTextEntry9] : no bone pain, left shoulder pain

## 2020-11-18 NOTE — ASSESSMENT
[Carbohydrate Consistent Diet] : carbohydrate consistent diet [FreeTextEntry1] : 70 y.o. female with h/o osteopenia, thyroid nodules and IFG.\par 1. Osteopenia- Clinically stable while on drug holiday since 11/2019.  DEXA scan performed today shows left femoral neck -1.7 and  total hip -1.8 which are stable and 1/3 radius -1.2 with 3.6% decrease. Given stable BMD, will proceed with one more year of drug holiday. Will repeat DEXA in Nov 2021.  Encouraged weight bearing activity. Will continue current calcium and vitamin D supplement. Will check CMP and 25 vitamin D level.\par 2. Thyroid nodules- Patient appears euthyroid. Will check TFTs.  Nodules are stable and will monitor for now. Repeat thyroid US in 1 to 2 years. \par 3. IFG-  Will check HBa1c. Will monitor for now and encouraged carbohydrate consistent diet and exercise. \par 4. Hyperlipidemia- Will check lipids. Encouraged low fat diet. \par \par Follow up in 1 year

## 2020-11-18 NOTE — HISTORY OF PRESENT ILLNESS
[FreeTextEntry1] : 70 y.o. female with h/o osteopenia and thyroid nodules here for follow up visit. Following with Dr. Escamilla for breast cancer s/p lumpectomy and RT and was on Arimidex for total of 10 years. Finished Arimidex in Sept 2019. Was on drug holiday from Alendronate for 2 years from November 2015 until November 2017. Was treated with Alendronate from 2005 until 2015. No falls or fractures.  Taking calcium 600 mg q daily and vitamin D 500 IU. DEXA scan in November 2017 showed femoral neck -2.3 total hip -2.2 and spine -0.7. A 6% decrease in the Fem neck was noted as compared to 2016. Patient was offered Prolia but she declined. She was restarted on Alendronate in November 2017 until November 2019. Doing some walking. Follows with dentist, no plans for major dental procedures. Reports has a history of gastric ulcers >10 years ago and had to get EGD. Reports has been tolerating Alendronate well for 10 years with no acid reflux. \par DEXA in November 2018, with femoral neck -2.4, stable, total hip -2.1 stable, and spine -1.2% a 4.5% increase. \par DEXA in November 2019- total spine 1.0, femoral neck -1.9, total hip -1.8, 1/3 radius -0.9. \par \par In regards to thyroid nodules, no neck pain, no dysphagia and no dysphonia. Sonogram in April 2017 showed increase in size of left lower pole and right mid/lower pole nodules. However, thyroid ultrasound on 11/2/17 showed stable b/l nodules. Had FNA in 2013 of left mid and lower pole nodules that were benign. Thyroid US in November 2018 showed stable thyroid nodules, with appearance of a new subcentimeter nodules on the right and left. No lymphadenopathy. Thyroid US in Nov 2019 shows stable b/l subcentimeter nodules. Thyroid US on 10/26/2020 shows stable b/l nodules with largest on the right 0.7 cm and largest on the left 0.7 cm. \par \par In regards to impaired fasting glucose, reports weight loss but eating the same. Due for colonoscopy this year. No change in bowels. Does like cookies. No polyuria and no polydipsia. \par

## 2020-11-18 NOTE — PHYSICAL EXAM
[Alert] : alert [No Acute Distress] : no acute distress [Normal Sclera/Conjunctiva] : normal sclera/conjunctiva [EOMI] : extra ocular movement intact [No LAD] : no lymphadenopathy [Thyroid Not Enlarged] : the thyroid was not enlarged [No Respiratory Distress] : no respiratory distress [Clear to Auscultation] : lungs were clear to auscultation bilaterally [Normal S1, S2] : normal S1 and S2 [Regular Rhythm] : with a regular rhythm [No Edema] : no peripheral edema [Normal Bowel Sounds] : normal bowel sounds [Not Tender] : non-tender [Not Distended] : not distended [Soft] : abdomen soft [Normal Anterior Cervical Nodes] : no anterior cervical lymphadenopathy [No Spinal Tenderness] : no spinal tenderness [Kyphosis] : kyphosis present [No Clubbing, Cyanosis] : no clubbing  or cyanosis of the fingernails [No Rash] : no rash [Normal Reflexes] : deep tendon reflexes were 2+ and symmetric [Normal Affect] : the affect was normal [Normal Mood] : the mood was normal

## 2020-11-20 LAB
25(OH)D3 SERPL-MCNC: 48 NG/ML
ALBUMIN SERPL ELPH-MCNC: 5.2 G/DL
ALP BLD-CCNC: 57 U/L
ALT SERPL-CCNC: 7 U/L
ANION GAP SERPL CALC-SCNC: 15 MMOL/L
AST SERPL-CCNC: 16 U/L
BASOPHILS # BLD AUTO: 0.07 K/UL
BASOPHILS NFR BLD AUTO: 1 %
BILIRUB SERPL-MCNC: 0.4 MG/DL
BUN SERPL-MCNC: 16 MG/DL
CALCIUM SERPL-MCNC: 10.4 MG/DL
CALCIUM SERPL-MCNC: 10.4 MG/DL
CHLORIDE SERPL-SCNC: 99 MMOL/L
CHOLEST SERPL-MCNC: 223 MG/DL
CO2 SERPL-SCNC: 26 MMOL/L
CREAT SERPL-MCNC: 0.9 MG/DL
EOSINOPHIL # BLD AUTO: 0.07 K/UL
EOSINOPHIL NFR BLD AUTO: 1 %
ESTIMATED AVERAGE GLUCOSE: 108 MG/DL
GLUCOSE SERPL-MCNC: 108 MG/DL
HBA1C MFR BLD HPLC: 5.4 %
HCT VFR BLD CALC: 42.2 %
HDLC SERPL-MCNC: 66 MG/DL
HGB BLD-MCNC: 14 G/DL
IMM GRANULOCYTES NFR BLD AUTO: 0.4 %
LDLC SERPL CALC-MCNC: 132 MG/DL
LYMPHOCYTES # BLD AUTO: 1.48 K/UL
LYMPHOCYTES NFR BLD AUTO: 21.2 %
MAN DIFF?: NORMAL
MCHC RBC-ENTMCNC: 31.3 PG
MCHC RBC-ENTMCNC: 33.2 GM/DL
MCV RBC AUTO: 94.2 FL
MONOCYTES # BLD AUTO: 0.34 K/UL
MONOCYTES NFR BLD AUTO: 4.9 %
NEUTROPHILS # BLD AUTO: 4.99 K/UL
NEUTROPHILS NFR BLD AUTO: 71.5 %
NONHDLC SERPL-MCNC: 157 MG/DL
PARATHYROID HORMONE INTACT: 34 PG/ML
PHOSPHATE SERPL-MCNC: 3.6 MG/DL
PLATELET # BLD AUTO: 313 K/UL
POTASSIUM SERPL-SCNC: 3.9 MMOL/L
PROT SERPL-MCNC: 7.9 G/DL
RBC # BLD: 4.48 M/UL
RBC # FLD: 13 %
SODIUM SERPL-SCNC: 140 MMOL/L
T4 FREE SERPL-MCNC: 1.5 NG/DL
TRIGL SERPL-MCNC: 130 MG/DL
TSH SERPL-ACNC: 1.24 UIU/ML
WBC # FLD AUTO: 6.98 K/UL

## 2021-06-24 ENCOUNTER — APPOINTMENT (OUTPATIENT)
Dept: MRI IMAGING | Facility: CLINIC | Age: 71
End: 2021-06-24
Payer: MEDICARE

## 2021-06-24 ENCOUNTER — OUTPATIENT (OUTPATIENT)
Dept: OUTPATIENT SERVICES | Facility: HOSPITAL | Age: 71
LOS: 1 days | End: 2021-06-24
Payer: MEDICARE

## 2021-06-24 ENCOUNTER — RESULT REVIEW (OUTPATIENT)
Age: 71
End: 2021-06-24

## 2021-06-24 DIAGNOSIS — Z00.8 ENCOUNTER FOR OTHER GENERAL EXAMINATION: ICD-10-CM

## 2021-06-24 PROCEDURE — C8908: CPT

## 2021-06-24 PROCEDURE — 77049 MRI BREAST C-+ W/CAD BI: CPT | Mod: 26

## 2021-06-24 PROCEDURE — C8937: CPT

## 2021-06-24 PROCEDURE — A9585: CPT

## 2021-09-08 ENCOUNTER — RESULT REVIEW (OUTPATIENT)
Age: 71
End: 2021-09-08

## 2021-09-08 ENCOUNTER — APPOINTMENT (OUTPATIENT)
Dept: MAMMOGRAPHY | Facility: CLINIC | Age: 71
End: 2021-09-08
Payer: MEDICARE

## 2021-09-08 ENCOUNTER — OUTPATIENT (OUTPATIENT)
Dept: OUTPATIENT SERVICES | Facility: HOSPITAL | Age: 71
LOS: 1 days | End: 2021-09-08
Payer: MEDICARE

## 2021-09-08 ENCOUNTER — APPOINTMENT (OUTPATIENT)
Dept: ULTRASOUND IMAGING | Facility: CLINIC | Age: 71
End: 2021-09-08
Payer: MEDICARE

## 2021-09-08 DIAGNOSIS — Z00.8 ENCOUNTER FOR OTHER GENERAL EXAMINATION: ICD-10-CM

## 2021-09-08 PROCEDURE — 76641 ULTRASOUND BREAST COMPLETE: CPT

## 2021-09-08 PROCEDURE — 77066 DX MAMMO INCL CAD BI: CPT

## 2021-09-08 PROCEDURE — G0279: CPT | Mod: 26

## 2021-09-08 PROCEDURE — 77066 DX MAMMO INCL CAD BI: CPT | Mod: 26

## 2021-09-08 PROCEDURE — 76641 ULTRASOUND BREAST COMPLETE: CPT | Mod: 26,50

## 2021-09-08 PROCEDURE — G0279: CPT

## 2021-10-28 ENCOUNTER — APPOINTMENT (OUTPATIENT)
Dept: CARDIOLOGY | Facility: CLINIC | Age: 71
End: 2021-10-28
Payer: MEDICARE

## 2021-10-28 ENCOUNTER — NON-APPOINTMENT (OUTPATIENT)
Age: 71
End: 2021-10-28

## 2021-10-28 VITALS
HEIGHT: 65 IN | OXYGEN SATURATION: 99 % | SYSTOLIC BLOOD PRESSURE: 148 MMHG | WEIGHT: 125 LBS | BODY MASS INDEX: 20.83 KG/M2 | HEART RATE: 98 BPM | DIASTOLIC BLOOD PRESSURE: 75 MMHG

## 2021-10-28 PROCEDURE — 99204 OFFICE O/P NEW MOD 45 MIN: CPT

## 2021-10-28 PROCEDURE — 93000 ELECTROCARDIOGRAM COMPLETE: CPT

## 2021-10-28 NOTE — HISTORY OF PRESENT ILLNESS
[FreeTextEntry1] : I saw Rina Amin in the office today for cardiac evaluation. She is a 71-year-old white female with breast cancer diagnosed in 2009 [facility] with [facility], [facility] This [facility] on the right breast.  She was last seen in this office 5/17..\par \par Her brother had a heart attack at age 53 and she's been anxious about this . Except for the dry cough she's been asymptomatic. She does walk several times a week. Check carotid Doppler in 2013 that showed a very mild area of calcium.\par \par At that time she had a cardiac evaluation.  Carotid Doppler 11/17 showed mild plaque.  Stress test was normal at workload of 7 METS.  EF 70%.  Cardiac calcium score was 59.\par \par Blood work performed 9/21 demonstrated cholesterol of 248, triglycerides 137, HDL 61, and .  Exercises several days a week but does not watch her diet carefully.\par \par Resting 12-lead electrocardiogram demonstrates sinus rhythm and appears to be normal.

## 2021-10-28 NOTE — REVIEW OF SYSTEMS
[Headache] : headache [Cough] : cough [Negative] : Genitourinary [Joint Pain] : no joint pain [Rash] : no rash [Dizziness] : no dizziness [Depression] : no depression [Anxiety] : no anxiety [Easy Bleeding] : no tendency for easy bleeding [Easy Bruising] : no tendency for easy bruising

## 2021-10-28 NOTE — REASON FOR VISIT
[Follow-Up - Clinic] : a clinic follow-up of [Hypertension] : hypertension [FreeTextEntry1] : No history of premature heart disease

## 2021-10-28 NOTE — DISCUSSION/SUMMARY
[FreeTextEntry1] : Is a 71-year-old white female with a family history of heart disease, and hypertension.  Her LDL cholesterol significant increased from 10 5-1 60s since I last saw her we discussed the importance of diet4 years ago.  Diet.  Start a statin drug.  Ideally her LDL cholesterol should be less than 70.  Going to start her on Crestor 10 mg once a day and she will watch her diet carefully.  She will take co-Q10.  She has any side effects she would call me.  Otherwise repeat her blood blood work in 3 months.\par \par She will schedule repeat nuclear stress test since her regular stress test was abnormal.  Assuming the stress test is okay I would continue her blood pressure medicine as it is working well.  We discussed taking low-dose aspirin which she wants not to do at this time since she did have a bleeding ulcer in the past.  Pending the above we may eventually start her on Plavix.\par \par This was all discussed with the patient in detail and answered her questions.  If all is well I would see her in 6 months.

## 2021-10-28 NOTE — PHYSICAL EXAM
[General Appearance - Well Developed] : well developed [Normal Appearance] : normal appearance [Well Groomed] : well groomed [General Appearance - Well Nourished] : well nourished [No Deformities] : no deformities [General Appearance - In No Acute Distress] : no acute distress [Normal Conjunctiva] : the conjunctiva exhibited no abnormalities [Normal Oral Mucosa] : normal oral mucosa [Normal Jugular Venous A Waves Present] : normal jugular venous A waves present [Normal Jugular Venous V Waves Present] : normal jugular venous V waves present [No Jugular Venous Mckeon A Waves] : no jugular venous mckeon A waves [Respiration, Rhythm And Depth] : normal respiratory rhythm and effort [Exaggerated Use Of Accessory Muscles For Inspiration] : no accessory muscle use [Bowel Sounds] : normal bowel sounds [Abnormal Walk] : normal gait [Abdomen Soft] : soft [Gait - Sufficient For Exercise Testing] : the gait was sufficient for exercise testing [Nail Clubbing] : no clubbing of the fingernails [Skin Color & Pigmentation] : normal skin color and pigmentation [Skin Turgor] : normal skin turgor [] : no rash [Oriented To Time, Place, And Person] : oriented to person, place, and time [Impaired Insight] : insight and judgment were intact [Normal Rate] : normal [Normal S1] : normal S1 [Normal S2] : normal S2 [No Murmur] : no murmurs heard [2+] : left 2+ [Palpated Width ___ (cm)] : palpated width of [unfilled]Ucm [No Pitting Edema] : no pitting edema present [FreeTextEntry1] : Dry crackles at bases [S3] : no S3 [S4] : no S4 [Right Carotid Bruit] : no bruit heard over the right carotid [Left Carotid Bruit] : no bruit heard over the left carotid [Right Femoral Bruit] : no bruit heard over the right femoral artery [Left Femoral Bruit] : no bruit heard over the left femoral artery

## 2021-11-01 ENCOUNTER — APPOINTMENT (OUTPATIENT)
Dept: CARDIOLOGY | Facility: CLINIC | Age: 71
End: 2021-11-01
Payer: MEDICARE

## 2021-11-01 PROCEDURE — 78452 HT MUSCLE IMAGE SPECT MULT: CPT

## 2021-11-01 PROCEDURE — 93015 CV STRESS TEST SUPVJ I&R: CPT

## 2021-11-01 PROCEDURE — A9500: CPT

## 2021-11-15 ENCOUNTER — APPOINTMENT (OUTPATIENT)
Dept: SURGERY | Facility: CLINIC | Age: 71
End: 2021-11-15
Payer: MEDICARE

## 2021-11-15 PROCEDURE — 99213K: CUSTOM

## 2021-11-19 ENCOUNTER — APPOINTMENT (OUTPATIENT)
Dept: ENDOCRINOLOGY | Facility: CLINIC | Age: 71
End: 2021-11-19
Payer: MEDICARE

## 2021-11-19 VITALS — HEIGHT: 65 IN | WEIGHT: 123 LBS | BODY MASS INDEX: 20.49 KG/M2 | TEMPERATURE: 97.6 F

## 2021-11-19 VITALS — DIASTOLIC BLOOD PRESSURE: 70 MMHG | HEART RATE: 104 BPM | SYSTOLIC BLOOD PRESSURE: 130 MMHG | OXYGEN SATURATION: 99 %

## 2021-11-19 PROCEDURE — ZZZZZ: CPT

## 2021-11-19 PROCEDURE — 77080 DXA BONE DENSITY AXIAL: CPT | Mod: GA

## 2021-11-19 PROCEDURE — 99215 OFFICE O/P EST HI 40 MIN: CPT | Mod: 25

## 2021-11-20 NOTE — DATA REVIEWED
[FreeTextEntry1] : Labs\par 9/27/21\par \par chol 248 HDL 61  \par TSH 1.53\par T4 7.8\par Hba1c 5.2%\par BUN/cr 19/1.0\par calcium 10.3\par

## 2021-11-20 NOTE — REVIEW OF SYSTEMS
[Headaches] : headaches [Negative] : Respiratory [Fatigue] : no fatigue [Recent Weight Gain (___ Lbs)] : no recent weight gain [Recent Weight Loss (___ Lbs)] : no recent weight loss [Constipation] : no constipation [Abdominal Pain] : no abdominal pain [Diarrhea] : no diarrhea [Polyuria] : no polyuria [Dry Skin] : no dry skin [Hair Loss] : no hair loss [Polydipsia] : no polydipsia [Swelling] : no swelling [FreeTextEntry9] : no bone pain, left shoulder pain but stable

## 2021-11-20 NOTE — HISTORY OF PRESENT ILLNESS
[FreeTextEntry1] : 71 y.o. female with h/o osteopenia and thyroid nodules here for follow up visit. Saw cardiology recently and had thallium stress. Now taking Crestor 10 mg daily. Following with oncology for breast cancer s/p lumpectomy and RT and was on Arimidex for total of 10 years. Finished Arimidex in Sept 2019. Was on drug holiday from Alendronate for 2 years from November 2015 until November 2017. Was treated with Alendronate from 2005 until 2015. No falls or fractures. Taking calcium 600 mg q daily and vitamin D 500 IU. DEXA scan in November 2017 showed femoral neck -2.3 total hip -2.2 and spine -0.7. A 6% decrease in the Fem neck was noted as compared to 2016. Patient was offered Prolia but she declined. She was restarted on Alendronate in November 2017 until November 2019. Doing some walking. Follows with dentist, no plans for major dental procedures. Reports has a history of gastric ulcers >10 years ago and had to get EGD. Reports has been tolerating Alendronate well for 10 years with no acid reflux. \par \par DEXA in November 2018, with femoral neck -2.4, stable, total hip -2.1 stable, and spine -1.2% a 4.5% increase. \par DEXA in November 2019- total spine 1.0, femoral neck -1.9, total hip -1.8, 1/3 radius -0.9. \par DEXA in November 2020 left femoral neck -1.7 and  total hip -1.8 which are stable and 1/3 radius -1.2 with 3.6% decrease. \par \par In regards to thyroid nodules, no neck pain, no dysphagia and no dysphonia. Sonogram in April 2017 showed increase in size of left lower pole and right mid/lower pole nodules. However, thyroid ultrasound on 11/2/17 showed stable b/l nodules. Had FNA in 2013 of left mid and lower pole nodules that were benign. Thyroid US in November 2018 showed stable thyroid nodules, with appearance of a new subcentimeter nodules on the right and left. No lymphadenopathy. Thyroid US in Nov 2019 shows stable b/l subcentimeter nodules. Thyroid US on 10/26/2020 shows stable b/l nodules with largest on the right 0.7 cm and largest on the left 0.7 cm. Thyroid US in October 2021 shows stable b/l nodules with largest on the right measuring 0.5 cm and on the left 1.0 cm with no abnormal LNs. \par \par In regards to impaired fasting glucose, reports weight loss in the past but stable since last year. Due for colonoscopy. No change in bowels. Watching diet. No polyuria and no polydipsia. \par

## 2021-11-20 NOTE — ASSESSMENT
[Carbohydrate Consistent Diet] : carbohydrate consistent diet [FreeTextEntry1] : 71 y.o. female with h/o osteopenia, thyroid nodules and IFG.\par \par 1. Osteopenia- She was clinically stable while on drug holiday since 11/2019.  DEXA scan performed today shows left femoral neck -2.1 with 7% decrease and  total hip -2.0 with 2.9% decrease and 1/3 radius -1.3 which is stable. Given decrease in BMD, will restart Alendronate 70 mg po weekly. Will repeat DEXA in November 2022.  Encouraged weight bearing activity. Will continue current calcium and vitamin D supplement. Normal CMP and 25 vitamin D level.\par \par 2. Thyroid nodules- Patient is euthyroid.  Nodules are stable and will monitor for now. Repeat thyroid US in 1 to 2 years. \par \par 3. IFG-   HBa1c is normal at 5.2%. Will monitor for now and encouraged carbohydrate consistent diet and exercise. \par \par 4. Hyperlipidemia- Will continue statin as per cardiology. Encouraged low fat diet. Will follow up with cardiology for repeat lipid panel. \par \par Follow up in 1 year [Bisphosphonate Therapy] : Risks  and benefits of bisphosphonate therapy were  discussed with the patient including gastroesophageal irritation, osteonecrosis of the jaw, and atypical femur fractures, and acute phase reaction [Bisphosphonates] : The patient was instructed to take bisphosphonates on an empty stomach with a full glass of water,and wait at least 30 minutes before eating or lying down

## 2022-01-13 LAB
ALBUMIN SERPL ELPH-MCNC: 5.2 G/DL
ALP BLD-CCNC: 49 U/L
ALT SERPL-CCNC: 11 U/L
ANION GAP SERPL CALC-SCNC: 14 MMOL/L
ANION GAP SERPL CALC-SCNC: 20 MMOL/L
AST SERPL-CCNC: 23 U/L
BASOPHILS # BLD AUTO: 0.1 K/UL
BASOPHILS NFR BLD AUTO: 1.4 %
BILIRUB SERPL-MCNC: 0.3 MG/DL
BUN SERPL-MCNC: 23 MG/DL
BUN SERPL-MCNC: 24 MG/DL
CALCIUM SERPL-MCNC: 10 MG/DL
CALCIUM SERPL-MCNC: 9.9 MG/DL
CHLORIDE SERPL-SCNC: 102 MMOL/L
CHLORIDE SERPL-SCNC: 102 MMOL/L
CHOLEST SERPL-MCNC: 152 MG/DL
CO2 SERPL-SCNC: 22 MMOL/L
CO2 SERPL-SCNC: 27 MMOL/L
CREAT SERPL-MCNC: 0.94 MG/DL
CREAT SERPL-MCNC: 0.97 MG/DL
EOSINOPHIL # BLD AUTO: 0.31 K/UL
EOSINOPHIL NFR BLD AUTO: 4.3 %
ESTIMATED AVERAGE GLUCOSE: 114 MG/DL
GLUCOSE SERPL-MCNC: 122 MG/DL
GLUCOSE SERPL-MCNC: 123 MG/DL
HBA1C MFR BLD HPLC: 5.6 %
HCT VFR BLD CALC: 43.8 %
HDLC SERPL-MCNC: 70 MG/DL
HGB BLD-MCNC: 14 G/DL
IMM GRANULOCYTES NFR BLD AUTO: 0.4 %
LDLC SERPL CALC-MCNC: 68 MG/DL
LYMPHOCYTES # BLD AUTO: 2.52 K/UL
LYMPHOCYTES NFR BLD AUTO: 35.2 %
MAN DIFF?: NORMAL
MCHC RBC-ENTMCNC: 30.6 PG
MCHC RBC-ENTMCNC: 32 GM/DL
MCV RBC AUTO: 95.6 FL
MONOCYTES # BLD AUTO: 0.48 K/UL
MONOCYTES NFR BLD AUTO: 6.7 %
NEUTROPHILS # BLD AUTO: 3.71 K/UL
NEUTROPHILS NFR BLD AUTO: 52 %
NONHDLC SERPL-MCNC: 82 MG/DL
PLATELET # BLD AUTO: 308 K/UL
POTASSIUM SERPL-SCNC: 4.4 MMOL/L
POTASSIUM SERPL-SCNC: 4.4 MMOL/L
PROT SERPL-MCNC: 7.9 G/DL
RBC # BLD: 4.58 M/UL
RBC # FLD: 12.8 %
SODIUM SERPL-SCNC: 142 MMOL/L
SODIUM SERPL-SCNC: 144 MMOL/L
TRIGL SERPL-MCNC: 73 MG/DL
TSH SERPL-ACNC: 2.51 UIU/ML
WBC # FLD AUTO: 7.15 K/UL

## 2022-01-20 ENCOUNTER — APPOINTMENT (OUTPATIENT)
Dept: CARDIOLOGY | Facility: CLINIC | Age: 72
End: 2022-01-20
Payer: MEDICARE

## 2022-01-20 VITALS
OXYGEN SATURATION: 98 % | WEIGHT: 125 LBS | BODY MASS INDEX: 20.83 KG/M2 | SYSTOLIC BLOOD PRESSURE: 106 MMHG | HEIGHT: 65 IN | HEART RATE: 96 BPM | DIASTOLIC BLOOD PRESSURE: 66 MMHG

## 2022-01-20 PROCEDURE — 99214 OFFICE O/P EST MOD 30 MIN: CPT

## 2022-01-20 NOTE — HISTORY OF PRESENT ILLNESS
[FreeTextEntry1] : I saw Rina Amin in the office today for cardiac evaluation. She is a 71-year-old white female with breast cancer diagnosed in 2009 treated with radiation, followed by [facility]. This was on the right breast.  She was last seen in this office 5/17..\par \par Her brother had a heart attack at age 53 and she's been anxious about this . Except for the dry cough she's been asymptomatic. She does walk several times a week. Check carotid Doppler in 2013 that showed a very mild area of calcium.\par \par At that time she had a cardiac evaluation.  Carotid Doppler 11/17 showed mild plaque.  Stress test 11/21 was normal at workload of 3 METS.  Cardiac calcium score was 59.\par \par Blood work performed 1/22 demonstrated cholesterol of 152, triglycerides 73, HDL 70, and LDL 68.  A1c was 5.6..  Exercises several days a week but does not watch her diet carefully.\par \par Resting 12-lead electrocardiogram demonstrates sinus rhythm and appears to be normal.

## 2022-01-20 NOTE — DISCUSSION/SUMMARY
[FreeTextEntry1] : Patient is doing well.  On her medication her cholesterol blood pressure well controlled.  She does have mild calcification of her coronary arteries but cannot take aspirin because of a history of ulcer.  Discussed taking Plavix and place of the aspirin.  She wants to think about it.  She does take a lot of Tylenol.\par \par She does have a resting heart rate that is on the fast side.  This has been a problem over the last several years.  There is no obvious reason for it.  She does exercise, and is no thyroid disease and not anemic.  She will schedule an echocardiogram.\par \par If all is well I will see her in 6 months.

## 2022-01-20 NOTE — PHYSICAL EXAM
[General Appearance - Well Developed] : well developed [Normal Appearance] : normal appearance [Well Groomed] : well groomed [General Appearance - Well Nourished] : well nourished [No Deformities] : no deformities [General Appearance - In No Acute Distress] : no acute distress [Normal Conjunctiva] : the conjunctiva exhibited no abnormalities [Normal Oral Mucosa] : normal oral mucosa [Normal Jugular Venous A Waves Present] : normal jugular venous A waves present [Normal Jugular Venous V Waves Present] : normal jugular venous V waves present [No Jugular Venous Mckeon A Waves] : no jugular venous mckeon A waves [Respiration, Rhythm And Depth] : normal respiratory rhythm and effort [Exaggerated Use Of Accessory Muscles For Inspiration] : no accessory muscle use [Bowel Sounds] : normal bowel sounds [Abdomen Soft] : soft [Abnormal Walk] : normal gait [Gait - Sufficient For Exercise Testing] : the gait was sufficient for exercise testing [Nail Clubbing] : no clubbing of the fingernails [Skin Color & Pigmentation] : normal skin color and pigmentation [Skin Turgor] : normal skin turgor [] : no rash [Oriented To Time, Place, And Person] : oriented to person, place, and time [Impaired Insight] : insight and judgment were intact [Normal Rate] : normal [Normal S1] : normal S1 [Normal S2] : normal S2 [No Murmur] : no murmurs heard [2+] : left 2+ [Palpated Width ___ (cm)] : palpated width of [unfilled]Ucm [No Pitting Edema] : no pitting edema present [FreeTextEntry1] : Dry crackles at bases [S3] : no S3 [S4] : no S4 [Right Carotid Bruit] : no bruit heard over the right carotid [Left Carotid Bruit] : no bruit heard over the left carotid [Right Femoral Bruit] : no bruit heard over the right femoral artery [Left Femoral Bruit] : no bruit heard over the left femoral artery

## 2022-01-25 ENCOUNTER — APPOINTMENT (OUTPATIENT)
Dept: CARDIOLOGY | Facility: CLINIC | Age: 72
End: 2022-01-25
Payer: MEDICARE

## 2022-01-25 PROCEDURE — 93306 TTE W/DOPPLER COMPLETE: CPT

## 2022-06-23 ENCOUNTER — LABORATORY RESULT (OUTPATIENT)
Age: 72
End: 2022-06-23

## 2022-06-23 ENCOUNTER — APPOINTMENT (OUTPATIENT)
Dept: CARDIOLOGY | Facility: CLINIC | Age: 72
End: 2022-06-23

## 2022-06-23 ENCOUNTER — APPOINTMENT (OUTPATIENT)
Dept: CARDIOLOGY | Facility: CLINIC | Age: 72
End: 2022-06-23
Payer: MEDICARE

## 2022-06-23 VITALS
BODY MASS INDEX: 20.83 KG/M2 | WEIGHT: 125 LBS | HEIGHT: 65 IN | HEART RATE: 89 BPM | DIASTOLIC BLOOD PRESSURE: 72 MMHG | OXYGEN SATURATION: 99 % | SYSTOLIC BLOOD PRESSURE: 130 MMHG

## 2022-06-23 DIAGNOSIS — I10 ESSENTIAL (PRIMARY) HYPERTENSION: ICD-10-CM

## 2022-06-23 DIAGNOSIS — R00.0 TACHYCARDIA, UNSPECIFIED: ICD-10-CM

## 2022-06-23 PROCEDURE — 99214 OFFICE O/P EST MOD 30 MIN: CPT

## 2022-06-23 PROCEDURE — 93224 XTRNL ECG REC UP TO 48 HRS: CPT

## 2022-06-23 NOTE — HISTORY OF PRESENT ILLNESS
[FreeTextEntry1] : I saw Rina Amin in the office today for cardiac follow up. She is a 71-year-old white female with breast cancer diagnosed in 2009 treated with radiation, followed by [facility]. This was on the right breast. .\par \par Her brother had a heart attack at age 53 and she's been anxious about this . Except for the dry cough she's been asymptomatic. She does walk several times a week. \par \par Carotid Doppler 10/21 showed very mild plaque.  Stress test 11/21 was normal at workload of 3 METS.  Cardiac calcium score 5/17 was 59.  Echocardiogram 1/22 demonstrated ejection fraction of 61%.  There was borderline posterior mitral valve prolapse with minimal MR.  Mild TR without pulmonary hypertension.  Stage I diastolic dysfunction.\par \par Blood work performed 1/22 demonstrated cholesterol of 152, triglycerides 73, HDL 70, and LDL 68.  A1c was 5.6..  Exercises several days a week with exercise her heart rate does go fast.  She walks about 2 miles an hour.  She walks fast she does feel slightly lightheaded.  She has no chest pain or significant shortness of breath or palpitation.  At home her blood pressures have been all in the normal range.\par \par

## 2022-06-23 NOTE — DISCUSSION/SUMMARY
[FreeTextEntry1] : Clinically the patient is doing well.  She has no chest pain, shortness of breath or significant palpitation.  She does have poor exercise tolerance the etiology which is uncertain.  She has a normal echo, minimal plaque on her carotid artery, and had a low calcium score.\par \par We discussed the importance of continued exercise on a regular basis.  She will do blood work, and she will wear a 24-hour Holter monitor to get some idea of her heart rate over the course of 24 hours.  She will stay on her present medications.  If all is well we would see her again in approximately 6 months.\par \par Gastroenterologist does not want to take aspirin because of a history of ulcer.  We discussed taking Plavix which she is does not want to do at this time.  She will check with the gastroenterologist see if she could take 81 mg every other day with Pepcid.

## 2022-06-27 ENCOUNTER — APPOINTMENT (OUTPATIENT)
Dept: MRI IMAGING | Facility: CLINIC | Age: 72
End: 2022-06-27
Payer: MEDICARE

## 2022-06-27 ENCOUNTER — OUTPATIENT (OUTPATIENT)
Dept: OUTPATIENT SERVICES | Facility: HOSPITAL | Age: 72
LOS: 1 days | End: 2022-06-27
Payer: MEDICARE

## 2022-06-27 DIAGNOSIS — Z00.8 ENCOUNTER FOR OTHER GENERAL EXAMINATION: ICD-10-CM

## 2022-06-27 LAB
25(OH)D3 SERPL-MCNC: 44.8 NG/ML
ALBUMIN SERPL ELPH-MCNC: 5.6 G/DL
ALP BLD-CCNC: 35 U/L
ALT SERPL-CCNC: 7 U/L
ANION GAP SERPL CALC-SCNC: 14 MMOL/L
AST SERPL-CCNC: 18 U/L
BASOPHILS # BLD AUTO: 0.09 K/UL
BASOPHILS NFR BLD AUTO: 1 %
BILIRUB SERPL-MCNC: 0.6 MG/DL
BUN SERPL-MCNC: 18 MG/DL
CALCIUM SERPL-MCNC: 10.2 MG/DL
CHLORIDE SERPL-SCNC: 101 MMOL/L
CHOLEST SERPL-MCNC: 155 MG/DL
CO2 SERPL-SCNC: 26 MMOL/L
COVID-19 SPIKE DOMAIN ANTIBODY INTERPRETATION: POSITIVE
CREAT SERPL-MCNC: 0.9 MG/DL
EGFR: 69 ML/MIN/1.73M2
EOSINOPHIL # BLD AUTO: 0.18 K/UL
EOSINOPHIL NFR BLD AUTO: 2.1 %
GLUCOSE SERPL-MCNC: 121 MG/DL
HCT VFR BLD CALC: 44.1 %
HDLC SERPL-MCNC: 79 MG/DL
HGB BLD-MCNC: 13.8 G/DL
IMM GRANULOCYTES NFR BLD AUTO: 0.3 %
LDLC SERPL CALC-MCNC: 58 MG/DL
LYMPHOCYTES # BLD AUTO: 2.09 K/UL
LYMPHOCYTES NFR BLD AUTO: 24.3 %
MAN DIFF?: NORMAL
MCHC RBC-ENTMCNC: 29.6 PG
MCHC RBC-ENTMCNC: 31.3 GM/DL
MCV RBC AUTO: 94.4 FL
MONOCYTES # BLD AUTO: 0.52 K/UL
MONOCYTES NFR BLD AUTO: 6 %
NEUTROPHILS # BLD AUTO: 5.7 K/UL
NEUTROPHILS NFR BLD AUTO: 66.3 %
NONHDLC SERPL-MCNC: 76 MG/DL
PLATELET # BLD AUTO: 311 K/UL
POTASSIUM SERPL-SCNC: 4.2 MMOL/L
PROT SERPL-MCNC: 8.1 G/DL
RBC # BLD: 4.67 M/UL
RBC # FLD: 13.3 %
SARS-COV-2 AB SERPL IA-ACNC: >250 U/ML
SODIUM SERPL-SCNC: 141 MMOL/L
T3 SERPL-MCNC: 123 NG/DL
T3RU NFR SERPL: 1 TBI
T4 FREE SERPL-MCNC: 1.5 NG/DL
T4 SERPL-MCNC: 9.1 UG/DL
TRIGL SERPL-MCNC: 90 MG/DL
TSH SERPL-ACNC: 1.76 UIU/ML
WBC # FLD AUTO: 8.61 K/UL

## 2022-06-27 PROCEDURE — C8937: CPT

## 2022-06-27 PROCEDURE — 77049 MRI BREAST C-+ W/CAD BI: CPT | Mod: 26,MH

## 2022-06-27 PROCEDURE — C8908: CPT | Mod: MH

## 2022-10-04 ENCOUNTER — RX RENEWAL (OUTPATIENT)
Age: 72
End: 2022-10-04

## 2022-10-12 ENCOUNTER — RX RENEWAL (OUTPATIENT)
Age: 72
End: 2022-10-12

## 2022-11-30 ENCOUNTER — APPOINTMENT (OUTPATIENT)
Dept: ENDOCRINOLOGY | Facility: CLINIC | Age: 72
End: 2022-11-30

## 2022-11-30 VITALS
OXYGEN SATURATION: 98 % | SYSTOLIC BLOOD PRESSURE: 120 MMHG | BODY MASS INDEX: 20.33 KG/M2 | WEIGHT: 122 LBS | HEART RATE: 84 BPM | TEMPERATURE: 97.7 F | DIASTOLIC BLOOD PRESSURE: 62 MMHG | HEIGHT: 64.9 IN

## 2022-11-30 PROCEDURE — ZZZZZ: CPT

## 2022-11-30 PROCEDURE — 99215 OFFICE O/P EST HI 40 MIN: CPT | Mod: 25

## 2022-11-30 PROCEDURE — 77080 DXA BONE DENSITY AXIAL: CPT

## 2022-11-30 NOTE — ASSESSMENT
[Carbohydrate Consistent Diet] : carbohydrate consistent diet [Bisphosphonate Therapy] : Risks  and benefits of bisphosphonate therapy were  discussed with the patient including gastroesophageal irritation, osteonecrosis of the jaw, and atypical femur fractures, and acute phase reaction [FreeTextEntry1] : 72 y.o. female with h/o osteopenia, thyroid nodules, IFG and hyperlipidemia.\par \par 1. Osteopenia- She was clinically stable while on drug holiday since 11/2019.  DEXA scan performed today shows left femoral neck -2.3 with 4.2% decrease and  total hip -2.0 which is stable and 1/3 radius -0.9 which is a 4.3% increase. Given decrease in BMD, will continue Alendronate 70 mg po weekly. Will repeat DEXA in November 2023. Encouraged weight bearing activity. Will continue current calcium and vitamin D supplement. Normal CMP and 25 vitamin D level.\par \par 2. Thyroid nodules- Patient is euthyroid.  Nodules are relatively stable; however there are 3 new nodules. Will monitor closely for now. Repeat thyroid US in 6 months\par \par 3. IFG-   HBa1c was normal at 5.6% in January 2022. Will monitor for now and encouraged a carbohydrate consistent diet and exercise. \par \par 4. Hyperlipidemia- Lipids are at goal. Will continue statin as per cardiology. Encouraged low fat diet. \par \par Follow up in 1 year

## 2022-11-30 NOTE — REVIEW OF SYSTEMS
[Nocturia] : nocturia [Headaches] : headaches [Negative] : Respiratory [Fatigue] : no fatigue [Recent Weight Gain (___ Lbs)] : no recent weight gain [Recent Weight Loss (___ Lbs)] : no recent weight loss [Constipation] : no constipation [Abdominal Pain] : no abdominal pain [Diarrhea] : no diarrhea [Polyuria] : no polyuria [Dry Skin] : no dry skin [Hair Loss] : no hair loss [Polydipsia] : no polydipsia [Swelling] : no swelling [FreeTextEntry8] : more frequent urination [FreeTextEntry9] : no bone pain, left shoulder pain but stable

## 2022-11-30 NOTE — HISTORY OF PRESENT ILLNESS
[FreeTextEntry1] : 72 y.o. female with h/o osteopenia and thyroid nodules here for follow up visit. Saw cardiology recently and had thallium stress. Now taking Crestor 10 mg daily. Following with oncology for breast cancer s/p lumpectomy and RT and was on Arimidex for total of 10 years. Finished Arimidex in Sept 2019. Was on drug holiday from Alendronate for 2 years from November 2015 until November 2017. Was treated with Alendronate from 2005 until 2015. Restarted Alendronate 70 mg po weekly in 11/2021. No falls or fractures. Taking calcium 630 mg q daily and vitamin D 500 IU. DEXA scan in November 2017 showed femoral neck -2.3 total hip -2.2 and spine -0.7. A 6% decrease in the Fem neck was noted as compared to 2016. Patient was offered Prolia but she declined. She was restarted on Alendronate in November 2017 until November 2019. Doing some walking. Follows with dentist, no plans for major dental procedures. Reports has a history of gastric ulcers >10 years ago and had to get EGD. Reports has been tolerating Alendronate well for 10 years with no acid reflux. \par \par DEXA in November 2018, with femoral neck -2.4, stable, total hip -2.1 stable, and spine -1.2% a 4.5% increase. \par DEXA in November 2019- total spine 1.0, femoral neck -1.9, total hip -1.8, 1/3 radius -0.9. \par DEXA in November 2020 left femoral neck -1.7 and  total hip -1.8 which are stable and 1/3 radius -1.2 with 3.6% decrease. \par DEXA in November 2021 shows left femoral neck -2.1 with 7% decrease and  total hip -2.0 with 2.9% decrease and 1/3 radius -1.3 which is stable.\par \par \par In regards to thyroid nodules, no neck pain, no dysphagia and no dysphonia. Thyroid ultrasound in April 2017 showed increase in size of left lower pole and right mid/lower pole nodules. However, thyroid ultrasound on 11/2/17 showed stable b/l nodules. Had FNA in 2013 of left mid and lower pole nodules that were benign. Thyroid US in November 2018 showed stable thyroid nodules, with appearance of a new subcentimeter nodules on the right and left. No lymphadenopathy. Thyroid US in Nov 2019 shows stable b/l subcentimeter nodules. Thyroid US on 10/26/2020 shows stable b/l nodules with largest on the right 0.7 cm and largest on the left 0.7 cm. Thyroid US in October 2021 shows stable b/l nodules with largest on the right measuring 0.5 cm and on the left 1.0 cm with no abnormal LNs. Thyroid US in October 2022 shows stable right nodules with new right 1.1 cm midpole nodule and new right lower pole nodule 0.4 cm and on the left stable nodule with the largest measuring 1.0 cm and new left midpole nodule measuring 0.4 cm.\par \par In regards to impaired fasting glucose, reports weight loss in the past but stable since last year. Due for colonoscopy. No change in bowels. Watching diet. No polyuria and no polydipsia. \par

## 2022-12-05 ENCOUNTER — OUTPATIENT (OUTPATIENT)
Dept: OUTPATIENT SERVICES | Facility: HOSPITAL | Age: 72
LOS: 1 days | End: 2022-12-05
Payer: MEDICARE

## 2022-12-05 ENCOUNTER — APPOINTMENT (OUTPATIENT)
Dept: MAMMOGRAPHY | Facility: CLINIC | Age: 72
End: 2022-12-05

## 2022-12-05 ENCOUNTER — APPOINTMENT (OUTPATIENT)
Dept: ULTRASOUND IMAGING | Facility: CLINIC | Age: 72
End: 2022-12-05

## 2022-12-05 DIAGNOSIS — Z00.8 ENCOUNTER FOR OTHER GENERAL EXAMINATION: ICD-10-CM

## 2022-12-05 PROCEDURE — 77063 BREAST TOMOSYNTHESIS BI: CPT | Mod: 26

## 2022-12-05 PROCEDURE — 77067 SCR MAMMO BI INCL CAD: CPT | Mod: 26

## 2022-12-05 PROCEDURE — 76641 ULTRASOUND BREAST COMPLETE: CPT

## 2022-12-05 PROCEDURE — 76641 ULTRASOUND BREAST COMPLETE: CPT | Mod: 26,50

## 2022-12-05 PROCEDURE — 77067 SCR MAMMO BI INCL CAD: CPT

## 2022-12-05 PROCEDURE — 77063 BREAST TOMOSYNTHESIS BI: CPT

## 2022-12-08 ENCOUNTER — NON-APPOINTMENT (OUTPATIENT)
Age: 72
End: 2022-12-08

## 2022-12-08 ENCOUNTER — APPOINTMENT (OUTPATIENT)
Dept: CARDIOLOGY | Facility: CLINIC | Age: 72
End: 2022-12-08

## 2022-12-08 VITALS
DIASTOLIC BLOOD PRESSURE: 69 MMHG | BODY MASS INDEX: 21.17 KG/M2 | HEART RATE: 70 BPM | SYSTOLIC BLOOD PRESSURE: 132 MMHG | HEIGHT: 64 IN | OXYGEN SATURATION: 100 % | WEIGHT: 124 LBS

## 2022-12-08 PROCEDURE — 93000 ELECTROCARDIOGRAM COMPLETE: CPT

## 2022-12-08 PROCEDURE — 99214 OFFICE O/P EST MOD 30 MIN: CPT

## 2022-12-08 NOTE — HISTORY OF PRESENT ILLNESS
[FreeTextEntry1] : I saw Rina Amin in the office today for cardiac follow up.  She was last seen in the office about 6 months ago.\par \par She has now 72 years old, with a history of breast cancer diagnosed in 2009, treated with radiation, and Arimidex.  There is a family history of early atherosclerosis, noting that her brother had a myocardial infarction at the age of 53.  She has had mild carotid plaque.  She was found to have an elevated calcium score of 59 in 2017 (76th percentile).  She has had borderline posterior mitral valve prolapse, with minimal mitral regurgitation.   She had stomach ulcers years ago.\par \par She presents to the office today having been feeling well.  She reports no chest discomfort or shortness of breath suggestive of angina.  She denies orthopnea, PND and lower extremity edema.  She denies palpitations, dizziness and syncope.  She reports that her blood pressure has been labile.  Her cholesterol have been well controlled.

## 2022-12-08 NOTE — DISCUSSION/SUMMARY
[FreeTextEntry1] : Overall, she is doing well from the cardiovascular perspective.  She does not have any symptoms of concern at this time.\par \par I reviewed her most recent lipid profile from June 2022.  This revealed an LDL cholesterol of 58.  Echocardiography was performed in January 2022.  This revealed borderline mitral valve prolapse, with minimal mitral regurgitation.  The ejection fraction was 61%.  Stress testing was performed in November 2021 which revealed no evidence of ischemia, but a poor exercise capacity, of 3 METS.\par \par I do not think additional testing is needed at this time.  We discussed the possibility of repeating her calcium score, but given her active treatment with a statin, and her history of breast cancer, I do not think that the benefit exceeds the risk of the radiation exposure.  I have asked her to increase her physical activity levels.  She used to walk 3 miles in an hour, and now walks 2 an hour.  I have asked her to try to do a little bit more exercise in the same period of time, and hopefully her exercise capacity will improve.\par \par I have suggested a follow-up in about 6 months.

## 2023-02-27 ENCOUNTER — APPOINTMENT (OUTPATIENT)
Dept: SURGERY | Facility: CLINIC | Age: 73
End: 2023-02-27
Payer: MEDICARE

## 2023-02-27 PROCEDURE — 99213K: CUSTOM

## 2023-06-02 ENCOUNTER — APPOINTMENT (OUTPATIENT)
Dept: CARDIOLOGY | Facility: CLINIC | Age: 73
End: 2023-06-02
Payer: MEDICARE

## 2023-06-02 ENCOUNTER — NON-APPOINTMENT (OUTPATIENT)
Age: 73
End: 2023-06-02

## 2023-06-02 VITALS
SYSTOLIC BLOOD PRESSURE: 132 MMHG | HEART RATE: 81 BPM | DIASTOLIC BLOOD PRESSURE: 70 MMHG | OXYGEN SATURATION: 98 % | HEIGHT: 64 IN | BODY MASS INDEX: 21.51 KG/M2 | WEIGHT: 126 LBS

## 2023-06-02 DIAGNOSIS — R82.71 BACTERIURIA: ICD-10-CM

## 2023-06-02 LAB
ALBUMIN SERPL ELPH-MCNC: 5.1 G/DL
ALP BLD-CCNC: 33 U/L
ALT SERPL-CCNC: 10 U/L
ANION GAP SERPL CALC-SCNC: 16 MMOL/L
APPEARANCE: CLEAR
AST SERPL-CCNC: 20 U/L
BACTERIA: ABNORMAL /HPF
BILIRUB SERPL-MCNC: 0.6 MG/DL
BILIRUBIN URINE: NEGATIVE
BLOOD URINE: ABNORMAL
BUN SERPL-MCNC: 26 MG/DL
CALCIUM SERPL-MCNC: 10 MG/DL
CAST: 1 /LPF
CHLORIDE SERPL-SCNC: 99 MMOL/L
CHOLEST SERPL-MCNC: 149 MG/DL
CO2 SERPL-SCNC: 25 MMOL/L
COLOR: YELLOW
CREAT SERPL-MCNC: 0.9 MG/DL
EGFR: 69 ML/MIN/1.73M2
EPITHELIAL CELLS: 4 /HPF
ESTIMATED AVERAGE GLUCOSE: 117 MG/DL
GLUCOSE QUALITATIVE U: NEGATIVE MG/DL
GLUCOSE SERPL-MCNC: 99 MG/DL
HBA1C MFR BLD HPLC: 5.7 %
HDLC SERPL-MCNC: 76 MG/DL
KETONES URINE: NEGATIVE MG/DL
LDLC SERPL CALC-MCNC: 55 MG/DL
LEUKOCYTE ESTERASE URINE: ABNORMAL
MICROSCOPIC-UA: NORMAL
NITRITE URINE: POSITIVE
NONHDLC SERPL-MCNC: 74 MG/DL
PH URINE: 7
POTASSIUM SERPL-SCNC: 3.7 MMOL/L
PROT SERPL-MCNC: 7.4 G/DL
PROTEIN URINE: NORMAL MG/DL
RED BLOOD CELLS URINE: 4 /HPF
SODIUM SERPL-SCNC: 140 MMOL/L
SPECIFIC GRAVITY URINE: 1.02
T3 SERPL-MCNC: 95 NG/DL
T4 SERPL-MCNC: 8.5 UG/DL
TRIGL SERPL-MCNC: 95 MG/DL
TSH SERPL-ACNC: 1.6 UIU/ML
UROBILINOGEN URINE: 0.2 MG/DL
WHITE BLOOD CELLS URINE: 22 /HPF

## 2023-06-02 PROCEDURE — 99214 OFFICE O/P EST MOD 30 MIN: CPT

## 2023-06-02 PROCEDURE — 93000 ELECTROCARDIOGRAM COMPLETE: CPT

## 2023-06-02 NOTE — HISTORY OF PRESENT ILLNESS
[FreeTextEntry1] : I saw Rina Amin in the office today for cardiac follow up.  She was last seen in the office about 6 months ago.\par \par She has now 72 years old, with a history of breast cancer diagnosed in 2009, treated with radiation, and Arimidex.  There is a family history of early atherosclerosis, noting that her brother had a myocardial infarction at the age of 53.  She has had mild carotid plaque.  She was found to have an elevated calcium score of 59 in 2017 (76th percentile).  She has had borderline posterior mitral valve prolapse, with minimal mitral regurgitation.   She had stomach ulcers years ago.\par \par She presents to the office today having been feeling well.  She reports no chest discomfort or shortness of breath suggestive of angina.  She has been trying to increase her physical activity, noting that I previously reviewed her stress test which showed a very limited exercise capacity.  She denies orthopnea, PND and lower extremity edema.  She denies palpitations, and syncope. She sometimes gets lightheaded, and tries to stay hydrated.  She reports that her blood pressure has been labile.  Her cholesterol has been well controlled.

## 2023-06-02 NOTE — DISCUSSION/SUMMARY
0 = swallows foods/liquids without difficulty [FreeTextEntry1] : Overall, she is doing well from the cardiovascular perspective.  She does not have any symptoms of concern at this time.\par \par I reviewed her most recent lipid profile from May 2023.  This revealed an LDL cholesterol of 55.  Echocardiography was performed in January 2022.  This revealed borderline mitral valve prolapse, with minimal mitral regurgitation.  The ejection fraction was 61%.  Stress testing was performed in November 2021 which revealed no evidence of ischemia, but a poor exercise capacity, of 3 METS.\par \par Her urinalysis suggest the possibility of an infection.  I have sent a urine culture.  I do not think other additional testing is needed at this time.  We discussed the possibility of repeating her calcium score, but given her active treatment with a statin, and her history of breast cancer, I do not think that the benefit exceeds the risk of the radiation exposure.  I have asked her to continue to increase her physical activity levels. have asked her to try to do a little bit more exercise in the same period of time, and hopefully her exercise capacity will improve.\par \par We discussed the possibility of aspirin.  Given her prior history of gastric ulcers, there is a reason to avoid it.  She is not status post revascularization, and there is no urgent need for antiplatelet therapy.  We can defer for now.\par \par I have suggested a follow-up in about 6 months.

## 2023-06-02 NOTE — PHYSICAL EXAM
[Normal Appearance] : normal appearance [General Appearance - Well Developed] : well developed [Well Groomed] : well groomed [General Appearance - Well Nourished] : well nourished [General Appearance - In No Acute Distress] : no acute distress [No Deformities] : no deformities [Normal Conjunctiva] : the conjunctiva exhibited no abnormalities [Normal Oral Mucosa] : normal oral mucosa [Normal Jugular Venous A Waves Present] : normal jugular venous A waves present [Normal Jugular Venous V Waves Present] : normal jugular venous V waves present [No Jugular Venous Mckeon A Waves] : no jugular venous mckeon A waves [Respiration, Rhythm And Depth] : normal respiratory rhythm and effort [Exaggerated Use Of Accessory Muscles For Inspiration] : no accessory muscle use [Bowel Sounds] : normal bowel sounds [Abdomen Soft] : soft [Abnormal Walk] : normal gait [Gait - Sufficient For Exercise Testing] : the gait was sufficient for exercise testing [Nail Clubbing] : no clubbing of the fingernails [Skin Color & Pigmentation] : normal skin color and pigmentation [Skin Turgor] : normal skin turgor [] : no rash [Oriented To Time, Place, And Person] : oriented to person, place, and time [Impaired Insight] : insight and judgment were intact [Normal Rate] : normal [Normal S2] : normal S2 [Normal S1] : normal S1 [No Murmur] : no murmurs heard [2+] : left 2+ [Palpated Width ___ (cm)] : palpated width of [unfilled]Ucm [No Pitting Edema] : no pitting edema present [FreeTextEntry1] : Dry crackles at bases [S3] : no S3 [S4] : no S4 [Right Carotid Bruit] : no bruit heard over the right carotid [Left Carotid Bruit] : no bruit heard over the left carotid [Left Femoral Bruit] : no bruit heard over the left femoral artery [Right Femoral Bruit] : no bruit heard over the right femoral artery

## 2023-06-07 DIAGNOSIS — N39.0 URINARY TRACT INFECTION, SITE NOT SPECIFIED: ICD-10-CM

## 2023-06-07 LAB — BACTERIA UR CULT: ABNORMAL

## 2023-06-07 RX ORDER — SULFAMETHOXAZOLE AND TRIMETHOPRIM 800; 160 MG/1; MG/1
800-160 TABLET ORAL TWICE DAILY
Qty: 6 | Refills: 0 | Status: ACTIVE | COMMUNITY
Start: 2023-06-07

## 2023-06-28 ENCOUNTER — OUTPATIENT (OUTPATIENT)
Dept: OUTPATIENT SERVICES | Facility: HOSPITAL | Age: 73
LOS: 1 days | End: 2023-06-28
Payer: MEDICARE

## 2023-06-28 ENCOUNTER — APPOINTMENT (OUTPATIENT)
Dept: MRI IMAGING | Facility: CLINIC | Age: 73
End: 2023-06-28
Payer: MEDICARE

## 2023-06-28 DIAGNOSIS — Z00.8 ENCOUNTER FOR OTHER GENERAL EXAMINATION: ICD-10-CM

## 2023-06-28 PROCEDURE — C8908: CPT

## 2023-06-28 PROCEDURE — C8937: CPT

## 2023-06-28 PROCEDURE — A9585: CPT

## 2023-06-28 PROCEDURE — 77049 MRI BREAST C-+ W/CAD BI: CPT | Mod: 26

## 2023-09-17 ENCOUNTER — RX RENEWAL (OUTPATIENT)
Age: 73
End: 2023-09-17

## 2023-09-17 RX ORDER — ALENDRONATE SODIUM 70 MG/1
70 TABLET ORAL
Qty: 12 | Refills: 3 | Status: ACTIVE | COMMUNITY
Start: 2017-12-05 | End: 1900-01-01

## 2023-10-19 ENCOUNTER — RX RENEWAL (OUTPATIENT)
Age: 73
End: 2023-10-19

## 2023-10-19 RX ORDER — ROSUVASTATIN CALCIUM 10 MG/1
10 TABLET, FILM COATED ORAL
Qty: 90 | Refills: 3 | Status: ACTIVE | COMMUNITY
Start: 2022-10-12

## 2023-11-29 ENCOUNTER — APPOINTMENT (OUTPATIENT)
Dept: ENDOCRINOLOGY | Facility: CLINIC | Age: 73
End: 2023-11-29
Payer: MEDICARE

## 2023-11-29 VITALS
DIASTOLIC BLOOD PRESSURE: 62 MMHG | OXYGEN SATURATION: 98 % | BODY MASS INDEX: 20.66 KG/M2 | WEIGHT: 124 LBS | HEART RATE: 80 BPM | SYSTOLIC BLOOD PRESSURE: 130 MMHG | HEIGHT: 64.9 IN

## 2023-11-29 DIAGNOSIS — E04.2 NONTOXIC MULTINODULAR GOITER: ICD-10-CM

## 2023-11-29 DIAGNOSIS — R73.01 IMPAIRED FASTING GLUCOSE: ICD-10-CM

## 2023-11-29 DIAGNOSIS — E78.5 HYPERLIPIDEMIA, UNSPECIFIED: ICD-10-CM

## 2023-11-29 DIAGNOSIS — M85.80 OTHER SPECIFIED DISORDERS OF BONE DENSITY AND STRUCTURE, UNSPECIFIED SITE: ICD-10-CM

## 2023-11-29 PROCEDURE — ZZZZZ: CPT

## 2023-11-29 PROCEDURE — 99214 OFFICE O/P EST MOD 30 MIN: CPT | Mod: 25

## 2023-11-29 PROCEDURE — 77080 DXA BONE DENSITY AXIAL: CPT | Mod: GA

## 2023-12-05 ENCOUNTER — NON-APPOINTMENT (OUTPATIENT)
Age: 73
End: 2023-12-05

## 2023-12-05 ENCOUNTER — APPOINTMENT (OUTPATIENT)
Dept: CARDIOLOGY | Facility: CLINIC | Age: 73
End: 2023-12-05
Payer: MEDICARE

## 2023-12-05 VITALS
HEART RATE: 79 BPM | HEIGHT: 64.9 IN | SYSTOLIC BLOOD PRESSURE: 131 MMHG | DIASTOLIC BLOOD PRESSURE: 68 MMHG | BODY MASS INDEX: 20.99 KG/M2 | WEIGHT: 126 LBS | OXYGEN SATURATION: 98 %

## 2023-12-05 PROCEDURE — 99214 OFFICE O/P EST MOD 30 MIN: CPT

## 2023-12-05 PROCEDURE — 93000 ELECTROCARDIOGRAM COMPLETE: CPT

## 2023-12-07 ENCOUNTER — APPOINTMENT (OUTPATIENT)
Dept: ULTRASOUND IMAGING | Facility: CLINIC | Age: 73
End: 2023-12-07
Payer: MEDICARE

## 2023-12-07 ENCOUNTER — OUTPATIENT (OUTPATIENT)
Dept: OUTPATIENT SERVICES | Facility: HOSPITAL | Age: 73
LOS: 1 days | End: 2023-12-07
Payer: MEDICARE

## 2023-12-07 ENCOUNTER — APPOINTMENT (OUTPATIENT)
Dept: MAMMOGRAPHY | Facility: CLINIC | Age: 73
End: 2023-12-07
Payer: MEDICARE

## 2023-12-07 DIAGNOSIS — Z00.8 ENCOUNTER FOR OTHER GENERAL EXAMINATION: ICD-10-CM

## 2023-12-07 PROCEDURE — 77067 SCR MAMMO BI INCL CAD: CPT

## 2023-12-07 PROCEDURE — 77067 SCR MAMMO BI INCL CAD: CPT | Mod: 26

## 2023-12-07 PROCEDURE — 76641 ULTRASOUND BREAST COMPLETE: CPT | Mod: 26,50,GY

## 2023-12-07 PROCEDURE — 77063 BREAST TOMOSYNTHESIS BI: CPT | Mod: 26

## 2023-12-07 PROCEDURE — 76641 ULTRASOUND BREAST COMPLETE: CPT

## 2023-12-07 PROCEDURE — 77063 BREAST TOMOSYNTHESIS BI: CPT

## 2024-01-08 ENCOUNTER — APPOINTMENT (OUTPATIENT)
Dept: CARDIOLOGY | Facility: CLINIC | Age: 74
End: 2024-01-08

## 2024-01-08 ENCOUNTER — NON-APPOINTMENT (OUTPATIENT)
Age: 74
End: 2024-01-08

## 2024-01-08 PROCEDURE — 93306 TTE W/DOPPLER COMPLETE: CPT

## 2024-01-10 ENCOUNTER — APPOINTMENT (OUTPATIENT)
Dept: CARDIOLOGY | Facility: CLINIC | Age: 74
End: 2024-01-10
Payer: MEDICARE

## 2024-01-10 PROCEDURE — 93015 CV STRESS TEST SUPVJ I&R: CPT

## 2024-01-10 PROCEDURE — 78452 HT MUSCLE IMAGE SPECT MULT: CPT

## 2024-01-10 PROCEDURE — A9500: CPT

## 2024-03-06 ENCOUNTER — APPOINTMENT (OUTPATIENT)
Dept: SURGERY | Facility: CLINIC | Age: 74
End: 2024-03-06
Payer: MEDICARE

## 2024-03-06 PROCEDURE — 99213K: CUSTOM

## 2024-03-20 ENCOUNTER — APPOINTMENT (OUTPATIENT)
Dept: ORTHOPEDIC SURGERY | Facility: CLINIC | Age: 74
End: 2024-03-20
Payer: MEDICARE

## 2024-03-20 PROCEDURE — 99204 OFFICE O/P NEW MOD 45 MIN: CPT

## 2024-03-20 PROCEDURE — 72082 X-RAY EXAM ENTIRE SPI 2/3 VW: CPT

## 2024-03-20 RX ORDER — LIDOCAINE 5% 700 MG/1
5 PATCH TOPICAL
Qty: 12 | Refills: 1 | Status: ACTIVE | COMMUNITY
Start: 2024-03-20 | End: 1900-01-01

## 2024-03-20 RX ORDER — TIZANIDINE 2 MG/1
2 TABLET ORAL EVERY 6 HOURS
Qty: 24 | Refills: 0 | Status: ACTIVE | COMMUNITY
Start: 2024-03-20 | End: 1900-01-01

## 2024-03-20 NOTE — ADDENDUM
[FreeTextEntry1] :  I, Rosalind Ken, acted solely as a scribe for Dr. Niraj Coon MD on this date 03/20/2024   All medical record entries made by the Scribe were at my, Dr. Niraj Coon MD., direction and personally dictated by me on 03/20/2024. I have reviewed the chart and agree that the record accurately reflects my personal performance of the history, physical exam, assessment and plan. I have also personally directed, reviewed, and agreed with the chart.

## 2024-03-20 NOTE — HISTORY OF PRESENT ILLNESS
[de-identified] : Patient is a 73 year old female who presents for initial eval of acute LT lower bp. Sxs exacerbated with sitting and walking. Stretching exercises failed to provide relief.  Difficulty crossing RT leg over LT initially; able to do so now.

## 2024-03-20 NOTE — PHYSICAL EXAM
[de-identified] : Lumbar Physical Exam   Gait - Normal  Station - Normal   Sagittal balance - Normal   Compensatory mechanism? - None      Reflexes    Patellar - normal    Gastroc - normal    Clonus - No    Hip Exam - Normal   Straight leg raise - none   Pulses - 2+ dp/pt   Range of motion - normal    Sensation   Sensation intact to light touch in L1, L2, L3, L4, L5 and S1 dermatomes bilaterally     Motor             IP Quad HS TA Gastroc EHL   Right 5/5  5/5   5/5 5/5    5/5     5/5       Left   5/5  5/5   5/5 5/5    5/5      5/5  [de-identified] : Lumbar Rads  deg scoliosis SVA wnl  facet arthropathy  disc degeneration

## 2024-03-20 NOTE — ASSESSMENT
[FreeTextEntry1] : This is a 73 year female with acute back pain. I had a lengthy discussion with the patient in regard to their treatment plan and diagnosis. Their symptoms have persisted despite the conservative management they have attempted thus far. As a result, they should continue to walk as much as possible. Rx LSO brace provided for comfort. The patient can take Tizanidine, Tylenol/NSAIDs as needed for pain control if medically able to. I will have the patient follow-up in 1 week for repeat clinical evaluation. I encouraged them to follow-up sooner if their symptoms worsen or change in any way.

## 2024-03-27 ENCOUNTER — OUTPATIENT (OUTPATIENT)
Dept: OUTPATIENT SERVICES | Facility: HOSPITAL | Age: 74
LOS: 1 days | End: 2024-03-27
Payer: MEDICARE

## 2024-03-27 ENCOUNTER — APPOINTMENT (OUTPATIENT)
Dept: MRI IMAGING | Facility: CLINIC | Age: 74
End: 2024-03-27
Payer: MEDICARE

## 2024-03-27 ENCOUNTER — APPOINTMENT (OUTPATIENT)
Dept: ORTHOPEDIC SURGERY | Facility: CLINIC | Age: 74
End: 2024-03-27
Payer: MEDICARE

## 2024-03-27 VITALS
WEIGHT: 126 LBS | HEIGHT: 64 IN | BODY MASS INDEX: 21.51 KG/M2 | DIASTOLIC BLOOD PRESSURE: 71 MMHG | SYSTOLIC BLOOD PRESSURE: 138 MMHG

## 2024-03-27 DIAGNOSIS — M54.9 DORSALGIA, UNSPECIFIED: ICD-10-CM

## 2024-03-27 PROCEDURE — 72148 MRI LUMBAR SPINE W/O DYE: CPT

## 2024-03-27 PROCEDURE — 99214 OFFICE O/P EST MOD 30 MIN: CPT

## 2024-03-27 PROCEDURE — 72148 MRI LUMBAR SPINE W/O DYE: CPT | Mod: 26,MH

## 2024-03-27 NOTE — ASSESSMENT
[FreeTextEntry1] : This is a 73-year female with acute lower back pain. I had a lengthy discussion with the patient in regard to their treatment plan and diagnosis. Their symptoms have persisted despite the conservative management they have attempted thus far. As a result, I would like to proceed with a lumbar MRI. In tandem with this they should begin a physical therapy/home therapy program. The patient can take Tylenol/NSAIDs as needed for pain control if medically able to. I will have the patient follow-up in 2 weeks for repeat clinical evaluation, and to review their MRI results. I encouraged them to follow-up sooner if their symptoms worsen or change in any way.  Conservative Treatment Statement The patient has tried the following treatments: Activity modification + Ice/Compression + Braces - NSAIDS + Physical Therapy +   Please note the above modalities have been tried for 6+ weeks over the past 2 months.

## 2024-03-27 NOTE — HISTORY OF PRESENT ILLNESS
[de-identified] : Patient is a 73 year old female who presents for f/u eval of acute lower BP. Sxs have improved some since last visit. Persistent pain in LT lower back radiating into hips. Details walking slowly for exercise. Sxs exacerbated with prolonged periods of sitting.   03.20.24 Patient is a 73 year old female who presents for initial eval of acute LT lower bp. Sxs exacerbated with sitting and walking. Stretching exercises failed to provide relief.  Difficulty crossing RT leg over LT initially; able to do so now.

## 2024-03-27 NOTE — PHYSICAL EXAM
[de-identified] : Lumbar Physical Exam   Gait - Normal  Station - Normal   Sagittal balance - Normal   Compensatory mechanism? - None      Reflexes    Patellar - normal    Gastroc - normal    Clonus - No    Hip Exam - Normal   Straight leg raise - none   Pulses - 2+ dp/pt   Range of motion - normal    Sensation   Sensation intact to light touch in L1, L2, L3, L4, L5 and S1 dermatomes bilaterally     Motor             IP Quad HS TA Gastroc EHL   Right 5/5  5/5   5/5 5/5    5/5     5/5    Left   5/5  5/5   5/5 5/5    5/5      5/5  [de-identified] : Lumbar Rads  deg scoliosis SVA wnl  facet arthropathy  disc degeneration

## 2024-03-27 NOTE — ADDENDUM
[FreeTextEntry1] :  I, Rosalind Ken, acted solely as a scribe for Dr. Niraj Coon MD on this date 03/27/2024   All medical record entries made by the Scribe were at my, Dr. Niraj Coon MD., direction and personally dictated by me on 03/27/2024. I have reviewed the chart and agree that the record accurately reflects my personal performance of the history, physical exam, assessment and plan. I have also personally directed, reviewed, and agreed with the chart.

## 2024-04-02 ENCOUNTER — NON-APPOINTMENT (OUTPATIENT)
Age: 74
End: 2024-04-02

## 2024-04-10 ENCOUNTER — APPOINTMENT (OUTPATIENT)
Dept: ORTHOPEDIC SURGERY | Facility: CLINIC | Age: 74
End: 2024-04-10
Payer: MEDICARE

## 2024-04-10 PROCEDURE — 99214 OFFICE O/P EST MOD 30 MIN: CPT

## 2024-04-10 NOTE — HISTORY OF PRESENT ILLNESS
[de-identified] : Today the patient states overall she is doing better.  She still dealing with low back pain.  She denies any radicular type symptoms.  She denies any bowel bladder issues.  She denies any saddle anesthesia.  She has been working with physical therapy and she has also been taking a muscle relaxer both of which have helped her.  She is here today to discuss next steps in her care.

## 2024-04-10 NOTE — ASSESSMENT
[FreeTextEntry1] : I had a lengthy discussion with the patient in regards to treatment plan and diagnosis. There are no red flag findings on imaging nor are there any red flag findings on clinical exam.  Therefore we will proceed with a course of conservative treatment.  This would include physical therapy/home exercise program, Tylenol, NSAIDs as medically indicated.  The patient will follow up with me in approximately 4 weeks.  I encouraged the patient to follow-up sooner if there are any new or worsening symptoms.

## 2024-04-10 NOTE — PHYSICAL EXAM
[de-identified] :   Lumbar Physical Exam  Gait - Normal  Station - Normal  Sagittal balance - Normal  Compensatory mechanism? - None  Heel walk - Normal  Toe walk - Normal  Reflexes Patellar - normal Gastroc - normal Clonus - No  Hip Exam - Normal  Straight leg raise - none  Pulses - 2+ dp/pt  Range of motion - normal  Sensation  Sensation intact to light touch in L1, L2, L3, L4, L5 and S1 dermatomes bilaterally  Motor 	IP	Quad	HS	TA	Gastroc	EHL Right	5/5	5/5	5/5	5/5	5/5	5/5 Left	5/5	5/5	5/5	5/5	5/5	5/5 [de-identified] : Lumbar MRI reviewed No areas of critical central or foraminal stenosis

## 2024-05-01 ENCOUNTER — APPOINTMENT (OUTPATIENT)
Dept: ORTHOPEDIC SURGERY | Facility: CLINIC | Age: 74
End: 2024-05-01
Payer: MEDICARE

## 2024-05-01 VITALS
SYSTOLIC BLOOD PRESSURE: 124 MMHG | BODY MASS INDEX: 21.51 KG/M2 | DIASTOLIC BLOOD PRESSURE: 80 MMHG | HEIGHT: 64 IN | WEIGHT: 126 LBS

## 2024-05-01 DIAGNOSIS — M54.9 DORSALGIA, UNSPECIFIED: ICD-10-CM

## 2024-05-01 PROCEDURE — 99214 OFFICE O/P EST MOD 30 MIN: CPT

## 2024-05-23 ENCOUNTER — NON-APPOINTMENT (OUTPATIENT)
Age: 74
End: 2024-05-23

## 2024-06-07 ENCOUNTER — NON-APPOINTMENT (OUTPATIENT)
Age: 74
End: 2024-06-07

## 2024-06-07 ENCOUNTER — APPOINTMENT (OUTPATIENT)
Dept: CARDIOLOGY | Facility: CLINIC | Age: 74
End: 2024-06-07
Payer: MEDICARE

## 2024-06-07 VITALS
HEIGHT: 64 IN | BODY MASS INDEX: 20.66 KG/M2 | DIASTOLIC BLOOD PRESSURE: 73 MMHG | WEIGHT: 121 LBS | SYSTOLIC BLOOD PRESSURE: 132 MMHG | HEART RATE: 72 BPM | OXYGEN SATURATION: 100 %

## 2024-06-07 DIAGNOSIS — I65.29 OCCLUSION AND STENOSIS OF UNSPECIFIED CAROTID ARTERY: ICD-10-CM

## 2024-06-07 DIAGNOSIS — I25.84 ATHEROSCLEROTIC HEART DISEASE OF NATIVE CORONARY ARTERY W/OUT ANGINA PECTORIS: ICD-10-CM

## 2024-06-07 DIAGNOSIS — I25.10 ATHEROSCLEROTIC HEART DISEASE OF NATIVE CORONARY ARTERY W/OUT ANGINA PECTORIS: ICD-10-CM

## 2024-06-07 PROCEDURE — 93000 ELECTROCARDIOGRAM COMPLETE: CPT

## 2024-06-07 PROCEDURE — 99214 OFFICE O/P EST MOD 30 MIN: CPT

## 2024-06-07 PROCEDURE — G2211 COMPLEX E/M VISIT ADD ON: CPT

## 2024-06-07 NOTE — DISCUSSION/SUMMARY
[FreeTextEntry1] : Overall, she is doing well from the cardiovascular perspective.  She does not have any symptoms of concern at this time.  I reviewed her lipid profile from May, 2023.  This revealed an LDL cholesterol of 55.  I reviewed her lipid profile from October 2023.  This revealed a well-controlled LDL cholesterol of 61.  Echocardiography was performed in January 2022.  This revealed borderline mitral valve prolapse, with minimal mitral regurgitation.  The ejection fraction was 61%.  Echocardiography was performed January 8, 2024.  This revealed a normal ejection fraction with concentric remodeling.  There was borderline posterior prolapse with minimal mitral regurgitation.  Stress testing was performed in November 2021 which revealed no evidence of ischemia, but a poor exercise capacity, of 3 METS.  Nuclear stress testing was performed in January, 2024.  This revealed no evidence of ischemia, with an exercise capacity of 4.6 METS.  And an ejection fraction of 77%.  Carotid ultrasound performed October 4, 2023 at an outside facility revealed mild atherosclerosis.   She will have blood work done this morning.  I have suggested a follow-up in about 6 months. [EKG obtained to assist in diagnosis and management of assessed problem(s)] : EKG obtained to assist in diagnosis and management of assessed problem(s)

## 2024-06-07 NOTE — PHYSICAL EXAM
[General Appearance - Well Developed] : well developed [Normal Appearance] : normal appearance [Well Groomed] : well groomed [General Appearance - Well Nourished] : well nourished [No Deformities] : no deformities [General Appearance - In No Acute Distress] : no acute distress [Normal Conjunctiva] : the conjunctiva exhibited no abnormalities [Normal Oral Mucosa] : normal oral mucosa [Normal Jugular Venous A Waves Present] : normal jugular venous A waves present [Normal Jugular Venous V Waves Present] : normal jugular venous V waves present [No Jugular Venous Mckeon A Waves] : no jugular venous mckeon A waves [Respiration, Rhythm And Depth] : normal respiratory rhythm and effort [Exaggerated Use Of Accessory Muscles For Inspiration] : no accessory muscle use [Bowel Sounds] : normal bowel sounds [Abdomen Soft] : soft [Abnormal Walk] : normal gait [Gait - Sufficient For Exercise Testing] : the gait was sufficient for exercise testing [Nail Clubbing] : no clubbing of the fingernails [Skin Color & Pigmentation] : normal skin color and pigmentation [Skin Turgor] : normal skin turgor [] : no rash [Oriented To Time, Place, And Person] : oriented to person, place, and time [Impaired Insight] : insight and judgment were intact [Normal Rate] : normal [Normal S2] : normal S2 [Normal S1] : normal S1 [No Murmur] : no murmurs heard [2+] : left 2+ [Palpated Width ___ (cm)] : palpated width of [unfilled]Ucm [No Pitting Edema] : no pitting edema present [FreeTextEntry1] : Dry crackles at bases [S3] : no S3 [S4] : no S4 [Right Carotid Bruit] : no bruit heard over the right carotid [Left Carotid Bruit] : no bruit heard over the left carotid [Right Femoral Bruit] : no bruit heard over the right femoral artery [Left Femoral Bruit] : no bruit heard over the left femoral artery

## 2024-06-07 NOTE — HISTORY OF PRESENT ILLNESS
[FreeTextEntry1] : I saw Rina Amin in the office today for cardiac follow up.  She was last seen in the office about 6 months ago.  She is now 73 years old, with a history of breast cancer diagnosed in 2009, treated with radiation, and Arimidex.  There is a family history of early atherosclerosis, noting that her brother had a myocardial infarction at the age of 53.  She has had mild carotid plaque.  She was found to have an elevated calcium score of 59 in 2017 (76th percentile).  She has had borderline posterior mitral valve prolapse, with minimal mitral regurgitation.   She had stomach ulcers years ago.  She presents to the office today having been feeling well.  She reports no chest discomfort suggestive of angina.  She has been trying to increase her physical activity, noting that I previously reviewed her stress test which showed a very limited exercise capacity.  Despite attempts at improving her exercise capacity, she remains with some significant shortness of breath with activity.  She remains unsure whether this is a problem or not.  She denies orthopnea, PND and lower extremity edema.  She denies palpitations, and syncope.  She reports that her blood pressure has been controlled.  Her cholesterol has been well controlled.

## 2024-06-10 LAB
ALBUMIN SERPL ELPH-MCNC: 5.1 G/DL
ALP BLD-CCNC: 31 U/L
ALT SERPL-CCNC: 9 U/L
ANION GAP SERPL CALC-SCNC: 15 MMOL/L
AST SERPL-CCNC: 22 U/L
BILIRUB SERPL-MCNC: 0.5 MG/DL
BUN SERPL-MCNC: 21 MG/DL
CALCIUM SERPL-MCNC: 10.3 MG/DL
CHLORIDE SERPL-SCNC: 101 MMOL/L
CHOLEST SERPL-MCNC: 159 MG/DL
CO2 SERPL-SCNC: 26 MMOL/L
CREAT SERPL-MCNC: 0.91 MG/DL
EGFR: 67 ML/MIN/1.73M2
ESTIMATED AVERAGE GLUCOSE: 117 MG/DL
GLUCOSE SERPL-MCNC: 112 MG/DL
HBA1C MFR BLD HPLC: 5.7 %
HCT VFR BLD CALC: 42.2 %
HDLC SERPL-MCNC: 74 MG/DL
HGB BLD-MCNC: 13.4 G/DL
LDLC SERPL CALC-MCNC: 66 MG/DL
MCHC RBC-ENTMCNC: 30.8 PG
MCHC RBC-ENTMCNC: 31.8 GM/DL
MCV RBC AUTO: 97 FL
NONHDLC SERPL-MCNC: 85 MG/DL
PLATELET # BLD AUTO: 290 K/UL
POTASSIUM SERPL-SCNC: 4.6 MMOL/L
PROT SERPL-MCNC: 7.7 G/DL
RBC # BLD: 4.35 M/UL
RBC # FLD: 13.6 %
SODIUM SERPL-SCNC: 141 MMOL/L
TRIGL SERPL-MCNC: 105 MG/DL
WBC # FLD AUTO: 7.48 K/UL

## 2024-06-12 ENCOUNTER — APPOINTMENT (OUTPATIENT)
Dept: MRI IMAGING | Facility: CLINIC | Age: 74
End: 2024-06-12
Payer: MEDICARE

## 2024-06-12 ENCOUNTER — OUTPATIENT (OUTPATIENT)
Dept: OUTPATIENT SERVICES | Facility: HOSPITAL | Age: 74
LOS: 1 days | End: 2024-06-12
Payer: MEDICARE

## 2024-06-12 DIAGNOSIS — Z00.8 ENCOUNTER FOR OTHER GENERAL EXAMINATION: ICD-10-CM

## 2024-06-12 PROCEDURE — C8908: CPT

## 2024-06-12 PROCEDURE — A9585: CPT

## 2024-06-12 PROCEDURE — C8937: CPT

## 2024-06-12 PROCEDURE — 77049 MRI BREAST C-+ W/CAD BI: CPT | Mod: 26

## 2024-08-19 ENCOUNTER — RX RENEWAL (OUTPATIENT)
Age: 74
End: 2024-08-19

## 2024-10-08 ENCOUNTER — NON-APPOINTMENT (OUTPATIENT)
Age: 74
End: 2024-10-08

## 2024-10-11 ENCOUNTER — RX RENEWAL (OUTPATIENT)
Age: 74
End: 2024-10-11

## 2024-11-04 ENCOUNTER — APPOINTMENT (OUTPATIENT)
Dept: ENDOCRINOLOGY | Facility: CLINIC | Age: 74
End: 2024-11-04
Payer: MEDICARE

## 2024-11-04 VITALS
HEIGHT: 64 IN | SYSTOLIC BLOOD PRESSURE: 130 MMHG | DIASTOLIC BLOOD PRESSURE: 70 MMHG | WEIGHT: 124 LBS | BODY MASS INDEX: 21.17 KG/M2

## 2024-11-04 DIAGNOSIS — E04.2 NONTOXIC MULTINODULAR GOITER: ICD-10-CM

## 2024-11-04 DIAGNOSIS — M85.80 OTHER SPECIFIED DISORDERS OF BONE DENSITY AND STRUCTURE, UNSPECIFIED SITE: ICD-10-CM

## 2024-11-04 DIAGNOSIS — E78.5 HYPERLIPIDEMIA, UNSPECIFIED: ICD-10-CM

## 2024-11-04 DIAGNOSIS — R73.01 IMPAIRED FASTING GLUCOSE: ICD-10-CM

## 2024-11-04 PROCEDURE — 99214 OFFICE O/P EST MOD 30 MIN: CPT

## 2024-11-04 PROCEDURE — G2211 COMPLEX E/M VISIT ADD ON: CPT

## 2024-11-05 LAB
ALBUMIN SERPL ELPH-MCNC: 5 G/DL
ALP BLD-CCNC: 33 U/L
ALT SERPL-CCNC: 7 U/L
ANION GAP SERPL CALC-SCNC: 16 MMOL/L
AST SERPL-CCNC: 22 U/L
BILIRUB SERPL-MCNC: 0.5 MG/DL
BUN SERPL-MCNC: 18 MG/DL
CALCIUM SERPL-MCNC: 10.7 MG/DL
CHLORIDE SERPL-SCNC: 101 MMOL/L
CHOLEST SERPL-MCNC: 166 MG/DL
CO2 SERPL-SCNC: 24 MMOL/L
CREAT SERPL-MCNC: 0.86 MG/DL
EGFR: 72 ML/MIN/1.73M2
ESTIMATED AVERAGE GLUCOSE: 105 MG/DL
GLUCOSE SERPL-MCNC: 103 MG/DL
HBA1C MFR BLD HPLC: 5.3 %
HDLC SERPL-MCNC: 86 MG/DL
LDLC SERPL CALC-MCNC: 65 MG/DL
NONHDLC SERPL-MCNC: 80 MG/DL
POTASSIUM SERPL-SCNC: 4.7 MMOL/L
PROT SERPL-MCNC: 7.8 G/DL
SODIUM SERPL-SCNC: 142 MMOL/L
T4 FREE SERPL-MCNC: 1.7 NG/DL
TRIGL SERPL-MCNC: 82 MG/DL
TSH SERPL-ACNC: 1.51 UIU/ML

## 2024-12-02 ENCOUNTER — APPOINTMENT (OUTPATIENT)
Dept: CARDIOLOGY | Facility: CLINIC | Age: 74
End: 2024-12-02
Payer: MEDICARE

## 2024-12-02 ENCOUNTER — NON-APPOINTMENT (OUTPATIENT)
Age: 74
End: 2024-12-02

## 2024-12-02 VITALS
DIASTOLIC BLOOD PRESSURE: 58 MMHG | HEART RATE: 90 BPM | BODY MASS INDEX: 21.17 KG/M2 | OXYGEN SATURATION: 96 % | WEIGHT: 124 LBS | HEIGHT: 64 IN | SYSTOLIC BLOOD PRESSURE: 103 MMHG

## 2024-12-02 DIAGNOSIS — R06.02 SHORTNESS OF BREATH: ICD-10-CM

## 2024-12-02 PROCEDURE — G2211 COMPLEX E/M VISIT ADD ON: CPT

## 2024-12-02 PROCEDURE — 93000 ELECTROCARDIOGRAM COMPLETE: CPT

## 2024-12-02 PROCEDURE — 99215 OFFICE O/P EST HI 40 MIN: CPT

## 2024-12-04 LAB
ALBUMIN SERPL ELPH-MCNC: 4.8 G/DL
ALP BLD-CCNC: 36 U/L
ALT SERPL-CCNC: 14 U/L
ANION GAP SERPL CALC-SCNC: 17 MMOL/L
AST SERPL-CCNC: 43 U/L
BILIRUB SERPL-MCNC: 0.4 MG/DL
BUN SERPL-MCNC: 23 MG/DL
CALCIUM SERPL-MCNC: 9.6 MG/DL
CHLORIDE SERPL-SCNC: 97 MMOL/L
CO2 SERPL-SCNC: 24 MMOL/L
CREAT SERPL-MCNC: 1.12 MG/DL
EGFR: 52 ML/MIN/1.73M2
GLUCOSE SERPL-MCNC: 107 MG/DL
POTASSIUM SERPL-SCNC: 3.8 MMOL/L
PROT SERPL-MCNC: 7.6 G/DL
SODIUM SERPL-SCNC: 138 MMOL/L

## 2024-12-09 ENCOUNTER — OUTPATIENT (OUTPATIENT)
Dept: OUTPATIENT SERVICES | Facility: HOSPITAL | Age: 74
LOS: 1 days | End: 2024-12-09
Payer: MEDICARE

## 2024-12-09 ENCOUNTER — APPOINTMENT (OUTPATIENT)
Dept: MAMMOGRAPHY | Facility: CLINIC | Age: 74
End: 2024-12-09
Payer: MEDICARE

## 2024-12-09 ENCOUNTER — APPOINTMENT (OUTPATIENT)
Dept: ULTRASOUND IMAGING | Facility: CLINIC | Age: 74
End: 2024-12-09
Payer: MEDICARE

## 2024-12-09 DIAGNOSIS — Z00.8 ENCOUNTER FOR OTHER GENERAL EXAMINATION: ICD-10-CM

## 2024-12-09 PROCEDURE — 76641 ULTRASOUND BREAST COMPLETE: CPT | Mod: 26,50,GA

## 2024-12-09 PROCEDURE — 77067 SCR MAMMO BI INCL CAD: CPT

## 2024-12-09 PROCEDURE — 77063 BREAST TOMOSYNTHESIS BI: CPT | Mod: 26

## 2024-12-09 PROCEDURE — 77067 SCR MAMMO BI INCL CAD: CPT | Mod: 26

## 2024-12-09 PROCEDURE — 76641 ULTRASOUND BREAST COMPLETE: CPT

## 2024-12-09 PROCEDURE — 77063 BREAST TOMOSYNTHESIS BI: CPT

## 2024-12-12 ENCOUNTER — APPOINTMENT (OUTPATIENT)
Dept: CT IMAGING | Facility: CLINIC | Age: 74
End: 2024-12-12
Payer: MEDICARE

## 2024-12-12 PROCEDURE — 75574 CT ANGIO HRT W/3D IMAGE: CPT | Mod: TC

## 2024-12-30 RX ORDER — LOSARTAN POTASSIUM 50 MG/1
50 TABLET, FILM COATED ORAL
Qty: 90 | Refills: 3 | Status: ACTIVE | COMMUNITY
Start: 2024-12-23

## 2025-01-06 ENCOUNTER — APPOINTMENT (OUTPATIENT)
Dept: CARDIOLOGY | Facility: CLINIC | Age: 75
End: 2025-01-06

## 2025-01-06 PROCEDURE — 93790 AMBL BP MNTR W/SW I&R: CPT

## 2025-03-03 ENCOUNTER — NON-APPOINTMENT (OUTPATIENT)
Age: 75
End: 2025-03-03

## 2025-03-03 ENCOUNTER — APPOINTMENT (OUTPATIENT)
Dept: CARDIOLOGY | Facility: CLINIC | Age: 75
End: 2025-03-03
Payer: MEDICARE

## 2025-03-03 VITALS
SYSTOLIC BLOOD PRESSURE: 118 MMHG | OXYGEN SATURATION: 99 % | WEIGHT: 124 LBS | HEIGHT: 64 IN | HEART RATE: 91 BPM | DIASTOLIC BLOOD PRESSURE: 69 MMHG | BODY MASS INDEX: 21.17 KG/M2

## 2025-03-03 DIAGNOSIS — I10 ESSENTIAL (PRIMARY) HYPERTENSION: ICD-10-CM

## 2025-03-03 DIAGNOSIS — I25.10 ATHEROSCLEROTIC HEART DISEASE OF NATIVE CORONARY ARTERY W/OUT ANGINA PECTORIS: ICD-10-CM

## 2025-03-03 PROCEDURE — 93000 ELECTROCARDIOGRAM COMPLETE: CPT

## 2025-03-03 PROCEDURE — 99214 OFFICE O/P EST MOD 30 MIN: CPT

## 2025-03-05 ENCOUNTER — APPOINTMENT (OUTPATIENT)
Dept: SURGERY | Facility: CLINIC | Age: 75
End: 2025-03-05
Payer: MEDICARE

## 2025-03-05 PROCEDURE — 99213K: CUSTOM

## 2025-06-13 ENCOUNTER — APPOINTMENT (OUTPATIENT)
Dept: MRI IMAGING | Facility: CLINIC | Age: 75
End: 2025-06-13
Payer: MEDICARE

## 2025-06-13 ENCOUNTER — OUTPATIENT (OUTPATIENT)
Dept: OUTPATIENT SERVICES | Facility: HOSPITAL | Age: 75
LOS: 1 days | End: 2025-06-13
Payer: MEDICARE

## 2025-06-13 DIAGNOSIS — Z00.8 ENCOUNTER FOR OTHER GENERAL EXAMINATION: ICD-10-CM

## 2025-06-13 PROCEDURE — C8908: CPT

## 2025-06-13 PROCEDURE — A9585: CPT

## 2025-06-13 PROCEDURE — 77049 MRI BREAST C-+ W/CAD BI: CPT | Mod: 26

## 2025-06-13 PROCEDURE — C8937: CPT

## 2025-09-11 ENCOUNTER — APPOINTMENT (OUTPATIENT)
Dept: CARDIOLOGY | Facility: CLINIC | Age: 75
End: 2025-09-11
Payer: MEDICARE

## 2025-09-11 VITALS
HEART RATE: 77 BPM | SYSTOLIC BLOOD PRESSURE: 116 MMHG | BODY MASS INDEX: 21.51 KG/M2 | HEIGHT: 64 IN | WEIGHT: 126 LBS | OXYGEN SATURATION: 98 % | DIASTOLIC BLOOD PRESSURE: 68 MMHG

## 2025-09-11 DIAGNOSIS — I65.29 OCCLUSION AND STENOSIS OF UNSPECIFIED CAROTID ARTERY: ICD-10-CM

## 2025-09-11 DIAGNOSIS — R60.0 LOCALIZED EDEMA: ICD-10-CM

## 2025-09-11 DIAGNOSIS — I25.10 ATHEROSCLEROTIC HEART DISEASE OF NATIVE CORONARY ARTERY W/OUT ANGINA PECTORIS: ICD-10-CM

## 2025-09-11 PROCEDURE — 93000 ELECTROCARDIOGRAM COMPLETE: CPT

## 2025-09-11 PROCEDURE — 99214 OFFICE O/P EST MOD 30 MIN: CPT
